# Patient Record
Sex: FEMALE | Race: WHITE | NOT HISPANIC OR LATINO | Employment: OTHER
[De-identification: names, ages, dates, MRNs, and addresses within clinical notes are randomized per-mention and may not be internally consistent; named-entity substitution may affect disease eponyms.]

---

## 2017-01-25 ENCOUNTER — CHARTING TRANS (OUTPATIENT)
Dept: OTHER | Age: 57
End: 2017-01-25

## 2017-01-25 ENCOUNTER — LAB SERVICES (OUTPATIENT)
Dept: OTHER | Age: 57
End: 2017-01-25

## 2017-01-28 LAB
25(OH)D3+25(OH)D2 SERPL-MCNC: 34.4 NG/ML (ref 30–100)
ALBUMIN SERPL-MCNC: 4.2 G/DL (ref 3.6–5.1)
ALBUMIN/GLOB SERPL: 1.4 (ref 1–2.4)
ALP SERPL-CCNC: 65 UNITS/L (ref 45–117)
ALT SERPL-CCNC: 42 UNITS/L
ANION GAP SERPL CALC-SCNC: 13 MMOL/L (ref 10–20)
AST SERPL-CCNC: 22 UNITS/L
BASOPHILS # BLD: 0.1 K/MCL (ref 0–0.3)
BASOPHILS NFR BLD: 1 %
BILIRUB SERPL-MCNC: 0.5 MG/DL (ref 0.2–1)
BUN SERPL-MCNC: 14 MG/DL (ref 10–20)
BUN/CREAT SERPL: 21 (ref 7–25)
CALCIUM SERPL-MCNC: 9.9 MG/DL (ref 8.4–10.2)
CHLORIDE SERPL-SCNC: 102 MMOL/L (ref 98–107)
CHOLEST SERPL-MCNC: 277 MG/DL
CHOLEST/HDLC SERPL: 3.2
CO2 SERPL-SCNC: 28 MMOL/L (ref 21–32)
CREAT SERPL-MCNC: 0.65 MG/DL (ref 0.51–0.95)
DIFFERENTIAL METHOD BLD: ABNORMAL
EOSINOPHIL # BLD: 0.3 K/MCL (ref 0.1–0.5)
EOSINOPHIL NFR BLD: 5 %
ERYTHROCYTE [DISTWIDTH] IN BLOOD: 13.1 % (ref 11–15)
GLOBULIN SER-MCNC: 3.1 G/DL (ref 2–4)
GLUCOSE SERPL-MCNC: 82 MG/DL (ref 65–99)
HCV AB SER QL: POSITIVE
HCV RNA SERPL NAA+PROBE-ACNC: NOT DETECTED
HCV RNA SERPL QL NAA+PROBE: NOT DETECTED
HDLC SERPL-MCNC: 87 MG/DL
HEMATOCRIT: 43.3 % (ref 36–46.5)
HEMOGLOBIN: 13.7 G/DL (ref 12–15.5)
LDLC SERPL CALC-MCNC: 177 MG/DL
LENGTH OF FAST TIME PATIENT: ABNORMAL HRS
LENGTH OF FAST TIME PATIENT: NORMAL HRS
LYMPHOCYTES # BLD: 1.9 K/MCL (ref 1–4)
LYMPHOCYTES NFR BLD: 41 %
MEAN CORPUSCULAR HEMOGLOBIN: 29.6 PG (ref 26–34)
MEAN CORPUSCULAR HGB CONC: 31.6 G/DL (ref 32–36.5)
MEAN CORPUSCULAR VOLUME: 93.5 FL (ref 78–100)
MONOCYTES # BLD: 0.5 K/MCL (ref 0.3–0.9)
MONOCYTES NFR BLD: 10 %
NEUTROPHILS # BLD: 2 K/MCL (ref 1.8–7.7)
NEUTROPHILS NFR BLD: 43 %
NONHDLC SERPL-MCNC: 190 MG/DL
PLATELET COUNT: ABNORMAL K/MCL (ref 140–450)
POTASSIUM SERPL-SCNC: 4 MMOL/L (ref 3.4–5.1)
RED CELL COUNT: 4.63 MIL/MCL (ref 4–5.2)
SODIUM SERPL-SCNC: 139 MMOL/L (ref 135–145)
TOTAL PROTEIN: 7.3 G/DL (ref 6.4–8.2)
TRIGL SERPL-MCNC: 64 MG/DL
TSH SERPL-ACNC: 1.35 MCUNITS/ML (ref 0.35–5)
WHITE BLOOD COUNT: 4.6 K/MCL (ref 4.2–11)

## 2017-01-30 ENCOUNTER — CHARTING TRANS (OUTPATIENT)
Dept: OTHER | Age: 57
End: 2017-01-30

## 2017-01-30 LAB — BACTERIA SPEC AEROBE CULT: NORMAL

## 2017-01-31 ENCOUNTER — CHARTING TRANS (OUTPATIENT)
Dept: OTHER | Age: 57
End: 2017-01-31

## 2017-02-01 LAB
CANDIDA RRNA VAG QL PROBE: NEGATIVE
G VAGINALIS RRNA GENITAL QL PROBE: NEGATIVE
PAPHPV WITH GENOTYPE PATIENTS OVER 30 YEARS OLD: NORMAL
T VAGINALIS RRNA GENITAL QL PROBE: NEGATIVE

## 2017-02-09 ENCOUNTER — IMAGING SERVICES (OUTPATIENT)
Dept: OTHER | Age: 57
End: 2017-02-09

## 2017-02-09 ENCOUNTER — HOSPITAL (OUTPATIENT)
Dept: OTHER | Age: 57
End: 2017-02-09
Attending: PHYSICIAN ASSISTANT

## 2017-02-10 ENCOUNTER — CHARTING TRANS (OUTPATIENT)
Dept: OTHER | Age: 57
End: 2017-02-10

## 2018-03-28 ENCOUNTER — LAB SERVICES (OUTPATIENT)
Dept: OTHER | Age: 58
End: 2018-03-28

## 2018-03-28 ENCOUNTER — CHARTING TRANS (OUTPATIENT)
Dept: OTHER | Age: 58
End: 2018-03-28

## 2018-04-02 ENCOUNTER — CHARTING TRANS (OUTPATIENT)
Dept: OTHER | Age: 58
End: 2018-04-02

## 2018-04-02 LAB — PAP WITH HIGH RISK HPV: NORMAL

## 2018-08-23 ENCOUNTER — CHARTING TRANS (OUTPATIENT)
Dept: OTHER | Age: 58
End: 2018-08-23

## 2018-11-01 VITALS
HEART RATE: 68 BPM | DIASTOLIC BLOOD PRESSURE: 64 MMHG | BODY MASS INDEX: 28.17 KG/M2 | WEIGHT: 165 LBS | HEIGHT: 64 IN | SYSTOLIC BLOOD PRESSURE: 110 MMHG | RESPIRATION RATE: 16 BRPM

## 2018-11-05 VITALS
DIASTOLIC BLOOD PRESSURE: 64 MMHG | HEIGHT: 64 IN | TEMPERATURE: 97.6 F | OXYGEN SATURATION: 98 % | WEIGHT: 165 LBS | BODY MASS INDEX: 28.17 KG/M2 | SYSTOLIC BLOOD PRESSURE: 100 MMHG | HEART RATE: 71 BPM

## 2018-11-05 VITALS
BODY MASS INDEX: 28.17 KG/M2 | WEIGHT: 165 LBS | TEMPERATURE: 97.6 F | OXYGEN SATURATION: 98 % | SYSTOLIC BLOOD PRESSURE: 86 MMHG | HEART RATE: 71 BPM | HEIGHT: 64 IN | DIASTOLIC BLOOD PRESSURE: 58 MMHG

## 2018-11-27 VITALS
DIASTOLIC BLOOD PRESSURE: 68 MMHG | SYSTOLIC BLOOD PRESSURE: 108 MMHG | RESPIRATION RATE: 16 BRPM | HEIGHT: 64 IN | BODY MASS INDEX: 28.32 KG/M2 | HEART RATE: 72 BPM

## 2018-12-07 ENCOUNTER — APPOINTMENT (OUTPATIENT)
Dept: FAMILY MEDICINE | Age: 58
End: 2018-12-07

## 2018-12-07 ENCOUNTER — TELEPHONE (OUTPATIENT)
Dept: FAMILY MEDICINE | Age: 58
End: 2018-12-07

## 2018-12-10 ENCOUNTER — OFFICE VISIT (OUTPATIENT)
Dept: FAMILY MEDICINE | Age: 58
End: 2018-12-10

## 2018-12-10 VITALS
BODY MASS INDEX: 29.57 KG/M2 | SYSTOLIC BLOOD PRESSURE: 120 MMHG | HEIGHT: 64 IN | DIASTOLIC BLOOD PRESSURE: 72 MMHG | WEIGHT: 173.2 LBS

## 2018-12-10 DIAGNOSIS — F41.8 MIXED ANXIETY AND DEPRESSIVE DISORDER: Primary | ICD-10-CM

## 2018-12-10 PROBLEM — Z12.31 ENCOUNTER FOR SCREENING MAMMOGRAM FOR BREAST CANCER: Status: ACTIVE | Noted: 2018-03-28

## 2018-12-10 PROBLEM — Z78.0 MENOPAUSE: Status: ACTIVE | Noted: 2017-01-25

## 2018-12-10 PROBLEM — Z01.411 ENCOUNTER FOR GYNECOLOGICAL EXAMINATION WITH ABNORMAL FINDING: Status: ACTIVE | Noted: 2018-03-28

## 2018-12-10 PROBLEM — Z86.010 HISTORY OF COLON POLYPS: Status: ACTIVE | Noted: 2017-02-07

## 2018-12-10 PROBLEM — K31.84 GASTROPARESIS: Status: ACTIVE | Noted: 2017-02-07

## 2018-12-10 PROBLEM — K63.5 COLON POLYPS: Status: ACTIVE | Noted: 2018-12-10

## 2018-12-10 PROBLEM — E78.00 HYPERCHOLESTEREMIA: Status: ACTIVE | Noted: 2017-01-31

## 2018-12-10 PROCEDURE — 99213 OFFICE O/P EST LOW 20 MIN: CPT | Performed by: PHYSICIAN ASSISTANT

## 2018-12-10 RX ORDER — BUPROPION HYDROCHLORIDE 300 MG/1
300 TABLET ORAL DAILY
Qty: 30 TABLET | Refills: 2 | Status: SHIPPED | OUTPATIENT
Start: 2018-12-10 | End: 2019-05-01 | Stop reason: SDUPTHER

## 2018-12-10 RX ORDER — BUPROPION HYDROCHLORIDE 150 MG/1
TABLET ORAL
COMMUNITY
Start: 2018-08-23 | End: 2018-12-10 | Stop reason: SDUPTHER

## 2018-12-10 RX ORDER — BUPROPION HYDROCHLORIDE 150 MG/1
300 TABLET ORAL DAILY
Qty: 30 TABLET | Refills: 2 | Status: CANCELLED | OUTPATIENT
Start: 2018-12-10

## 2018-12-17 ASSESSMENT — ENCOUNTER SYMPTOMS
SLEEP DISTURBANCE: 0
NERVOUS/ANXIOUS: 1
FATIGUE: 0
VOMITING: 0
ANOREXIA: 0
NAUSEA: 0
CHEST TIGHTNESS: 0
APPETITE CHANGE: 0
WHEEZING: 0
SHORTNESS OF BREATH: 0
DIZZINESS: 0
ABDOMINAL PAIN: 0
HEADACHES: 0

## 2019-01-13 ENCOUNTER — HOSPITAL (OUTPATIENT)
Dept: OTHER | Age: 59
End: 2019-01-13
Attending: PHYSICIAN ASSISTANT

## 2019-05-01 ENCOUNTER — TELEPHONE (OUTPATIENT)
Dept: FAMILY MEDICINE | Age: 59
End: 2019-05-01

## 2019-05-01 RX ORDER — BUPROPION HYDROCHLORIDE 300 MG/1
300 TABLET ORAL DAILY
Qty: 30 TABLET | Refills: 0 | Status: SHIPPED | OUTPATIENT
Start: 2019-05-01 | End: 2019-05-22 | Stop reason: SDUPTHER

## 2019-05-09 ENCOUNTER — APPOINTMENT (OUTPATIENT)
Dept: FAMILY MEDICINE | Age: 59
End: 2019-05-09

## 2019-05-22 ENCOUNTER — APPOINTMENT (OUTPATIENT)
Dept: FAMILY MEDICINE | Age: 59
End: 2019-05-22

## 2019-05-22 ENCOUNTER — OFFICE VISIT (OUTPATIENT)
Dept: FAMILY MEDICINE | Age: 59
End: 2019-05-22

## 2019-05-22 VITALS
DIASTOLIC BLOOD PRESSURE: 68 MMHG | HEART RATE: 72 BPM | WEIGHT: 145 LBS | SYSTOLIC BLOOD PRESSURE: 110 MMHG | HEIGHT: 64 IN | BODY MASS INDEX: 24.75 KG/M2

## 2019-05-22 DIAGNOSIS — F41.8 MIXED ANXIETY AND DEPRESSIVE DISORDER: Primary | ICD-10-CM

## 2019-05-22 PROCEDURE — 99213 OFFICE O/P EST LOW 20 MIN: CPT | Performed by: PHYSICIAN ASSISTANT

## 2019-05-22 RX ORDER — BUPROPION HYDROCHLORIDE 300 MG/1
300 TABLET ORAL DAILY
Qty: 90 TABLET | Refills: 1 | Status: SHIPPED | OUTPATIENT
Start: 2019-05-22 | End: 2019-06-06

## 2019-05-22 ASSESSMENT — PATIENT HEALTH QUESTIONNAIRE - PHQ9
2. FEELING DOWN, DEPRESSED OR HOPELESS: NOT AT ALL
SUM OF ALL RESPONSES TO PHQ9 QUESTIONS 1 AND 2: 0
1. LITTLE INTEREST OR PLEASURE IN DOING THINGS: NOT AT ALL
SUM OF ALL RESPONSES TO PHQ9 QUESTIONS 1 AND 2: 0

## 2019-06-04 ENCOUNTER — TELEPHONE (OUTPATIENT)
Dept: FAMILY MEDICINE | Age: 59
End: 2019-06-04

## 2019-06-06 RX ORDER — BUPROPION HYDROCHLORIDE 150 MG/1
150 TABLET, EXTENDED RELEASE ORAL 2 TIMES DAILY
Qty: 180 TABLET | Refills: 1 | Status: SHIPPED | OUTPATIENT
Start: 2019-06-06 | End: 2020-01-21 | Stop reason: SDUPTHER

## 2019-07-01 ENCOUNTER — OFFICE VISIT (OUTPATIENT)
Dept: FAMILY MEDICINE | Age: 59
End: 2019-07-01

## 2019-07-01 VITALS
HEART RATE: 58 BPM | SYSTOLIC BLOOD PRESSURE: 104 MMHG | RESPIRATION RATE: 16 BRPM | BODY MASS INDEX: 23.9 KG/M2 | TEMPERATURE: 97.2 F | HEIGHT: 64 IN | OXYGEN SATURATION: 100 % | DIASTOLIC BLOOD PRESSURE: 68 MMHG | WEIGHT: 140 LBS

## 2019-07-01 DIAGNOSIS — R22.0 MASS OF POSTAURICULAR AREA: Primary | ICD-10-CM

## 2019-07-01 PROCEDURE — 99202 OFFICE O/P NEW SF 15 MIN: CPT | Performed by: FAMILY MEDICINE

## 2019-07-01 SDOH — HEALTH STABILITY: MENTAL HEALTH
STRESS IS WHEN SOMEONE FEELS TENSE, NERVOUS, ANXIOUS, OR CAN'T SLEEP AT NIGHT BECAUSE THEIR MIND IS TROUBLED. HOW STRESSED ARE YOU?: NOT AT ALL

## 2019-07-01 SDOH — HEALTH STABILITY: PHYSICAL HEALTH: ON AVERAGE, HOW MANY MINUTES DO YOU ENGAGE IN EXERCISE AT THIS LEVEL?: 0 MIN

## 2019-07-01 SDOH — HEALTH STABILITY: PHYSICAL HEALTH: ON AVERAGE, HOW MANY DAYS PER WEEK DO YOU ENGAGE IN MODERATE TO STRENUOUS EXERCISE (LIKE A BRISK WALK)?: 0 DAYS

## 2019-07-01 ASSESSMENT — ENCOUNTER SYMPTOMS
SINUS PAIN: 0
SORE THROAT: 0
HEADACHES: 0
FEVER: 0
DIZZINESS: 0
RHINORRHEA: 0

## 2019-07-01 ASSESSMENT — PATIENT HEALTH QUESTIONNAIRE - PHQ9
SUM OF ALL RESPONSES TO PHQ9 QUESTIONS 1 AND 2: 0
1. LITTLE INTEREST OR PLEASURE IN DOING THINGS: NOT AT ALL
SUM OF ALL RESPONSES TO PHQ9 QUESTIONS 1 AND 2: 0
2. FEELING DOWN, DEPRESSED OR HOPELESS: NOT AT ALL

## 2019-08-01 ENCOUNTER — OFFICE VISIT (OUTPATIENT)
Dept: FAMILY MEDICINE | Age: 59
End: 2019-08-01

## 2019-08-01 VITALS
BODY MASS INDEX: 22.98 KG/M2 | RESPIRATION RATE: 16 BRPM | HEART RATE: 66 BPM | WEIGHT: 134.6 LBS | HEIGHT: 64 IN | SYSTOLIC BLOOD PRESSURE: 100 MMHG | DIASTOLIC BLOOD PRESSURE: 62 MMHG

## 2019-08-01 DIAGNOSIS — Z00.00 PREVENTATIVE HEALTH CARE: Primary | ICD-10-CM

## 2019-08-01 DIAGNOSIS — E78.2 MIXED HYPERLIPIDEMIA: ICD-10-CM

## 2019-08-01 DIAGNOSIS — Z12.39 SCREENING FOR BREAST CANCER: ICD-10-CM

## 2019-08-01 DIAGNOSIS — E87.5 HYPERKALEMIA: ICD-10-CM

## 2019-08-01 PROCEDURE — 80050 GENERAL HEALTH PANEL: CPT | Performed by: PHYSICIAN ASSISTANT

## 2019-08-01 PROCEDURE — 81001 URINALYSIS AUTO W/SCOPE: CPT | Performed by: PHYSICIAN ASSISTANT

## 2019-08-01 PROCEDURE — 80061 LIPID PANEL: CPT | Performed by: PHYSICIAN ASSISTANT

## 2019-08-01 PROCEDURE — 99396 PREV VISIT EST AGE 40-64: CPT | Performed by: PHYSICIAN ASSISTANT

## 2019-08-01 ASSESSMENT — PATIENT HEALTH QUESTIONNAIRE - PHQ9
1. LITTLE INTEREST OR PLEASURE IN DOING THINGS: NOT AT ALL
SUM OF ALL RESPONSES TO PHQ9 QUESTIONS 1 AND 2: 0
SUM OF ALL RESPONSES TO PHQ9 QUESTIONS 1 AND 2: 0
2. FEELING DOWN, DEPRESSED OR HOPELESS: NOT AT ALL

## 2019-08-01 ASSESSMENT — ENCOUNTER SYMPTOMS
ALLERGIC/IMMUNOLOGIC NEGATIVE: 1
CONSTITUTIONAL NEGATIVE: 1
EYES NEGATIVE: 1
PSYCHIATRIC NEGATIVE: 1
NEUROLOGICAL NEGATIVE: 1
ENDOCRINE NEGATIVE: 1
RESPIRATORY NEGATIVE: 1
GASTROINTESTINAL NEGATIVE: 1
HEMATOLOGIC/LYMPHATIC NEGATIVE: 1

## 2019-08-02 LAB
ALBUMIN SERPL-MCNC: 3.8 G/DL (ref 3.6–5.1)
ALBUMIN/GLOB SERPL: 1.4 {RATIO} (ref 1–2.4)
ALP SERPL-CCNC: 66 UNITS/L (ref 45–117)
ALT SERPL-CCNC: 22 UNITS/L
ANION GAP SERPL CALC-SCNC: 10 MMOL/L (ref 10–20)
APPEARANCE UR: ABNORMAL
AST SERPL-CCNC: 17 UNITS/L
BACTERIA #/AREA URNS HPF: ABNORMAL /HPF
BASOPHILS # BLD AUTO: 0.1 K/MCL (ref 0–0.3)
BASOPHILS NFR BLD AUTO: 1 %
BILIRUB SERPL-MCNC: 0.4 MG/DL (ref 0.2–1)
BILIRUB UR QL STRIP: NEGATIVE
BUN SERPL-MCNC: 14 MG/DL (ref 6–20)
BUN/CREAT SERPL: 25 (ref 7–25)
CALCIUM SERPL-MCNC: 9.2 MG/DL (ref 8.4–10.2)
CHLORIDE SERPL-SCNC: 106 MMOL/L (ref 98–107)
CHOLEST SERPL-MCNC: 297 MG/DL
CHOLEST/HDLC SERPL: 3.3 {RATIO}
CO2 SERPL-SCNC: 28 MMOL/L (ref 21–32)
COLOR UR: YELLOW
CREAT SERPL-MCNC: 0.57 MG/DL (ref 0.51–0.95)
DIFFERENTIAL METHOD BLD: ABNORMAL
EOSINOPHIL # BLD AUTO: 0.2 K/MCL (ref 0.1–0.5)
EOSINOPHIL NFR SPEC: 4 %
ERYTHROCYTE [DISTWIDTH] IN BLOOD: 12.8 % (ref 11–15)
FASTING STATUS PATIENT QL REPORTED: ABNORMAL HRS
GLOBULIN SER-MCNC: 2.7 G/DL (ref 2–4)
GLUCOSE SERPL-MCNC: 89 MG/DL (ref 65–99)
GLUCOSE UR STRIP-MCNC: NEGATIVE MG/DL
HCT VFR BLD CALC: 39.9 % (ref 36–46.5)
HDLC SERPL-MCNC: 89 MG/DL
HGB BLD-MCNC: 12.4 G/DL (ref 12–15.5)
HGB UR QL STRIP: NEGATIVE
HYALINE CASTS #/AREA URNS LPF: ABNORMAL /LPF (ref 0–5)
IMM GRANULOCYTES # BLD AUTO: 0 K/MCL (ref 0–0.2)
IMM GRANULOCYTES NFR BLD: 0 %
KETONES UR STRIP-MCNC: NEGATIVE MG/DL
LDLC SERPL-MCNC: 195 MG/DL
LENGTH OF FAST TIME PATIENT: ABNORMAL HRS
LEUKOCYTE ESTERASE UR QL STRIP: ABNORMAL
LYMPHOCYTES # BLD MANUAL: 2.1 K/MCL (ref 1–4)
LYMPHOCYTES NFR BLD MANUAL: 44 %
MCH RBC QN AUTO: 30 PG (ref 26–34)
MCHC RBC AUTO-ENTMCNC: 31.1 G/DL (ref 32–36.5)
MCV RBC AUTO: 96.4 FL (ref 78–100)
MONOCYTES # BLD MANUAL: 0.4 K/MCL (ref 0.3–0.9)
MONOCYTES NFR BLD MANUAL: 8 %
MUCOUS THREADS URNS QL MICRO: PRESENT
NEUTROPHILS # BLD: 2.1 K/MCL (ref 1.8–7.7)
NEUTROPHILS NFR BLD AUTO: 43 %
NITRITE UR QL STRIP: NEGATIVE
NONHDLC SERPL-MCNC: 208 MG/DL
NRBC BLD MANUAL-RTO: 0 /100 WBC
PH UR STRIP: 6 UNITS (ref 5–7)
PLATELET # BLD: ABNORMAL K/MCL (ref 140–450)
POTASSIUM SERPL-SCNC: 5.4 MMOL/L (ref 3.4–5.1)
PROT SERPL-MCNC: 6.5 G/DL (ref 6.4–8.2)
PROT UR STRIP-MCNC: NEGATIVE MG/DL
RBC # BLD: 4.14 MIL/MCL (ref 4–5.2)
RBC #/AREA URNS HPF: ABNORMAL /HPF (ref 0–2)
SODIUM SERPL-SCNC: 139 MMOL/L (ref 135–145)
SP GR UR STRIP: 1.02 (ref 1–1.03)
SPECIMEN SOURCE: ABNORMAL
SQUAMOUS #/AREA URNS HPF: ABNORMAL /HPF (ref 0–5)
TRIGL SERPL-MCNC: 63 MG/DL
TSH SERPL-ACNC: 2.1 MCUNITS/ML (ref 0.35–5)
UROBILINOGEN UR STRIP-MCNC: 0.2 MG/DL (ref 0–1)
WBC # BLD: 4.8 K/MCL (ref 4.2–11)
WBC #/AREA URNS HPF: ABNORMAL /HPF (ref 0–5)

## 2019-08-02 RX ORDER — ROSUVASTATIN CALCIUM 20 MG/1
20 TABLET, COATED ORAL AT BEDTIME
Qty: 90 TABLET | Refills: 0 | Status: SHIPPED | OUTPATIENT
Start: 2019-08-02

## 2019-08-05 ENCOUNTER — LAB SERVICES (OUTPATIENT)
Dept: FAMILY MEDICINE | Age: 59
End: 2019-08-05

## 2019-08-05 DIAGNOSIS — E87.5 HYPERKALEMIA: ICD-10-CM

## 2019-08-05 PROCEDURE — 84132 ASSAY OF SERUM POTASSIUM: CPT | Performed by: PHYSICIAN ASSISTANT

## 2019-08-06 LAB — POTASSIUM SERPL-SCNC: 4.4 MMOL/L (ref 3.4–5.1)

## 2019-08-07 ENCOUNTER — TELEPHONE (OUTPATIENT)
Dept: FAMILY MEDICINE | Age: 59
End: 2019-08-07

## 2020-01-21 ENCOUNTER — TELEPHONE (OUTPATIENT)
Dept: FAMILY MEDICINE | Age: 60
End: 2020-01-21

## 2020-01-21 RX ORDER — BUPROPION HYDROCHLORIDE 150 MG/1
150 TABLET, EXTENDED RELEASE ORAL 2 TIMES DAILY
Qty: 180 TABLET | Refills: 0 | Status: SHIPPED | OUTPATIENT
Start: 2020-01-21 | End: 2020-05-21 | Stop reason: SDUPTHER

## 2020-05-20 ENCOUNTER — TELEPHONE (OUTPATIENT)
Dept: FAMILY MEDICINE | Age: 60
End: 2020-05-20

## 2020-05-21 ENCOUNTER — V-VISIT (OUTPATIENT)
Dept: FAMILY MEDICINE | Age: 60
End: 2020-05-21

## 2020-05-21 ENCOUNTER — E-ADVICE (OUTPATIENT)
Dept: FAMILY MEDICINE | Age: 60
End: 2020-05-21

## 2020-05-21 VITALS — HEIGHT: 64 IN | BODY MASS INDEX: 21.85 KG/M2 | WEIGHT: 128 LBS

## 2020-05-21 DIAGNOSIS — F41.8 MIXED ANXIETY AND DEPRESSIVE DISORDER: Primary | ICD-10-CM

## 2020-05-21 PROCEDURE — 99212 OFFICE O/P EST SF 10 MIN: CPT | Performed by: PHYSICIAN ASSISTANT

## 2020-05-21 RX ORDER — BUPROPION HYDROCHLORIDE 150 MG/1
TABLET, EXTENDED RELEASE ORAL
Qty: 22 TABLET | Refills: 0 | Status: SHIPPED | OUTPATIENT
Start: 2020-05-21

## 2020-05-21 ASSESSMENT — PATIENT HEALTH QUESTIONNAIRE - PHQ9
2. FEELING DOWN, DEPRESSED OR HOPELESS: NOT AT ALL
SUM OF ALL RESPONSES TO PHQ9 QUESTIONS 1 AND 2: 0
CLINICAL INTERPRETATION OF PHQ9 SCORE: NO FURTHER SCREENING NEEDED
CLINICAL INTERPRETATION OF PHQ2 SCORE: NO FURTHER SCREENING NEEDED
SUM OF ALL RESPONSES TO PHQ9 QUESTIONS 1 AND 2: 0
1. LITTLE INTEREST OR PLEASURE IN DOING THINGS: NOT AT ALL

## 2020-05-27 ASSESSMENT — ENCOUNTER SYMPTOMS
DEPRESSION: 1
NERVOUS/ANXIOUS: 0
INSOMNIA: 0
CONSTIPATION: 0
NAUSEA: 0
VOMITING: 0

## 2021-04-22 ENCOUNTER — HOSPITAL ENCOUNTER (OUTPATIENT)
Dept: GENERAL RADIOLOGY | Age: 61
Discharge: HOME | End: 2021-04-22
Attending: FAMILY MEDICINE

## 2021-04-22 ENCOUNTER — WALK IN (OUTPATIENT)
Dept: URGENT CARE | Age: 61
End: 2021-04-22
Attending: FAMILY MEDICINE

## 2021-04-22 VITALS
SYSTOLIC BLOOD PRESSURE: 114 MMHG | OXYGEN SATURATION: 100 % | BODY MASS INDEX: 25.27 KG/M2 | WEIGHT: 148 LBS | HEIGHT: 64 IN | TEMPERATURE: 97.8 F | RESPIRATION RATE: 18 BRPM | HEART RATE: 57 BPM | DIASTOLIC BLOOD PRESSURE: 64 MMHG

## 2021-04-22 DIAGNOSIS — S67.191A CRUSHING INJURY OF LEFT INDEX FINGER, INITIAL ENCOUNTER: ICD-10-CM

## 2021-04-22 DIAGNOSIS — R11.0 NAUSEA: ICD-10-CM

## 2021-04-22 DIAGNOSIS — S62.660A CLOSED NONDISPLACED FRACTURE OF DISTAL PHALANX OF RIGHT INDEX FINGER, INITIAL ENCOUNTER: Primary | ICD-10-CM

## 2021-04-22 PROCEDURE — 73140 X-RAY EXAM OF FINGER(S): CPT

## 2021-04-22 PROCEDURE — 10002803 HB RX 637: Performed by: FAMILY MEDICINE

## 2021-04-22 PROCEDURE — 29130 APPL FINGER SPLINT STATIC: CPT

## 2021-04-22 PROCEDURE — 99213 OFFICE O/P EST LOW 20 MIN: CPT

## 2021-04-22 RX ORDER — ONDANSETRON 4 MG/1
4 TABLET, FILM COATED ORAL EVERY 8 HOURS PRN
Qty: 5 TABLET | Refills: 0 | Status: SHIPPED | OUTPATIENT
Start: 2021-04-22

## 2021-04-22 RX ORDER — ONDANSETRON 4 MG/1
4 TABLET, ORALLY DISINTEGRATING ORAL ONCE
Status: COMPLETED | OUTPATIENT
Start: 2021-04-22 | End: 2021-04-22

## 2021-04-22 RX ORDER — HYDROCODONE BITARTRATE AND ACETAMINOPHEN 5; 325 MG/1; MG/1
1 TABLET ORAL EVERY 6 HOURS PRN
Qty: 10 TABLET | Refills: 0 | Status: SHIPPED | OUTPATIENT
Start: 2021-04-22

## 2021-04-22 RX ADMIN — ONDANSETRON 4 MG: 4 TABLET, ORALLY DISINTEGRATING ORAL at 12:24

## 2021-04-22 ASSESSMENT — ENCOUNTER SYMPTOMS
FEVER: 0
SINUS PAIN: 0
VOMITING: 0
ABDOMINAL PAIN: 0
NAUSEA: 0
DIARRHEA: 0
CHEST TIGHTNESS: 0
COUGH: 0
HEADACHES: 0
DIZZINESS: 0
APPETITE CHANGE: 0
WHEEZING: 0
FATIGUE: 0
EYE PAIN: 0
SHORTNESS OF BREATH: 0
ADENOPATHY: 0
SLEEP DISTURBANCE: 0
SORE THROAT: 0

## 2021-11-08 ENCOUNTER — EXTERNAL RECORD (OUTPATIENT)
Dept: HEALTH INFORMATION MANAGEMENT | Facility: OTHER | Age: 61
End: 2021-11-08

## 2021-11-16 ENCOUNTER — TELEPHONE (OUTPATIENT)
Dept: OBGYN | Age: 61
End: 2021-11-16

## 2021-11-17 ENCOUNTER — TELEPHONE (OUTPATIENT)
Dept: OBGYN | Age: 61
End: 2021-11-17

## 2021-12-02 ENCOUNTER — LAB SERVICES (OUTPATIENT)
Dept: OBGYN | Age: 61
End: 2021-12-02

## 2021-12-02 DIAGNOSIS — N95.1 SYMPTOMATIC MENOPAUSAL OR FEMALE CLIMACTERIC STATES: Primary | ICD-10-CM

## 2022-01-01 ENCOUNTER — EXTERNAL RECORD (OUTPATIENT)
Dept: OTHER | Age: 62
End: 2022-01-01

## 2022-01-11 ENCOUNTER — APPOINTMENT (OUTPATIENT)
Dept: OBGYN | Age: 62
End: 2022-01-11

## 2022-02-08 ENCOUNTER — APPOINTMENT (OUTPATIENT)
Dept: OBGYN | Age: 62
End: 2022-02-08

## 2022-03-24 ENCOUNTER — OFF PREMISE (OUTPATIENT)
Dept: OBGYN | Age: 62
End: 2022-03-24

## 2022-03-24 ENCOUNTER — TELEPHONE (OUTPATIENT)
Dept: OBGYN | Age: 62
End: 2022-03-24

## 2022-05-13 ENCOUNTER — APPOINTMENT (OUTPATIENT)
Dept: OBGYN | Age: 62
End: 2022-05-13

## 2022-05-16 ENCOUNTER — LAB SERVICES (OUTPATIENT)
Dept: LAB | Age: 62
End: 2022-05-16
Attending: NURSE PRACTITIONER

## 2022-05-16 DIAGNOSIS — N95.1 SYMPTOMATIC MENOPAUSAL OR FEMALE CLIMACTERIC STATES: ICD-10-CM

## 2022-05-16 LAB
ESTRADIOL SERPL-MCNC: 146 PG/ML
PROGEST SERPL-MCNC: 5.9 NG/ML
RAINBOW EXTRA TUBES HOLD SPECIMEN: NORMAL

## 2022-05-16 PROCEDURE — 84144 ASSAY OF PROGESTERONE: CPT

## 2022-05-16 PROCEDURE — 10000083 MISCELLANEOUS TEST

## 2022-05-16 PROCEDURE — 82681 ASSAY DIR MEAS FR ESTRADIOL: CPT

## 2022-05-16 PROCEDURE — 82627 DEHYDROEPIANDROSTERONE: CPT

## 2022-05-16 PROCEDURE — 84403 ASSAY OF TOTAL TESTOSTERONE: CPT

## 2022-05-16 PROCEDURE — 82677 ASSAY OF ESTRIOL: CPT

## 2022-05-16 PROCEDURE — 36415 COLL VENOUS BLD VENIPUNCTURE: CPT

## 2022-05-16 PROCEDURE — 82670 ASSAY OF TOTAL ESTRADIOL: CPT

## 2022-05-17 LAB
DHEA-S SERPL-MCNC: 37.6 MCG/DL (ref 8–391)
ESTRIOL SERPL-MCNC: 0.23 NG/ML

## 2022-05-19 ENCOUNTER — OFF PREMISE (OUTPATIENT)
Dept: OBGYN | Age: 62
End: 2022-05-19

## 2022-05-23 LAB
REF LAB TEST NAME: NORMAL
SERVICE CMNT-IMP: NORMAL

## 2022-05-27 LAB
SHBG SERPL-SCNC: 84 NMOL/L (ref 17–125)
TESTOST FREE SERPL DL<=1.0 NG/DL-MCNC: 1.8 PG/ML (ref 0.6–3.8)
TESTOST SERPL-MCNC: 20 NG/DL (ref 5–32)

## 2022-06-21 ENCOUNTER — TELEPHONIC VISIT (OUTPATIENT)
Dept: OBGYN | Age: 62
End: 2022-06-21

## 2022-06-21 ENCOUNTER — OFF PREMISE (OUTPATIENT)
Dept: OBGYN | Age: 62
End: 2022-06-21

## 2022-06-21 DIAGNOSIS — N95.1 SYMPTOMATIC MENOPAUSAL OR FEMALE CLIMACTERIC STATES: Primary | ICD-10-CM

## 2022-06-21 PROCEDURE — 99443 TELEPHONE E&M BY PHYSICIAN EST PT NOT ORIG PREV 7 DAYS 21-30 MIN: CPT | Performed by: NURSE PRACTITIONER

## 2022-06-21 ASSESSMENT — ENCOUNTER SYMPTOMS
LIGHT-HEADEDNESS: 0
DIAPHORESIS: 0
FATIGUE: 0
FEVER: 0
CONSTIPATION: 0
NAUSEA: 0
VOMITING: 0
COLOR CHANGE: 0
CHILLS: 0
DIARRHEA: 0
BLOOD IN STOOL: 0
ABDOMINAL PAIN: 0
APPETITE CHANGE: 0
HEADACHES: 0
SHORTNESS OF BREATH: 0
EYES NEGATIVE: 1
ABDOMINAL DISTENTION: 0
DIZZINESS: 0
PSYCHIATRIC NEGATIVE: 1
UNEXPECTED WEIGHT CHANGE: 0
ACTIVITY CHANGE: 0
ADENOPATHY: 0
COUGH: 0

## 2022-12-14 ENCOUNTER — OFF PREMISE (OUTPATIENT)
Dept: OBGYN | Age: 62
End: 2022-12-14

## 2023-03-27 ENCOUNTER — OFFICE VISIT (OUTPATIENT)
Dept: INTERNAL MEDICINE CLINIC | Facility: CLINIC | Age: 63
End: 2023-03-27
Payer: COMMERCIAL

## 2023-03-27 VITALS
OXYGEN SATURATION: 97 % | HEART RATE: 79 BPM | DIASTOLIC BLOOD PRESSURE: 70 MMHG | WEIGHT: 179 LBS | HEIGHT: 64 IN | SYSTOLIC BLOOD PRESSURE: 120 MMHG | BODY MASS INDEX: 30.56 KG/M2

## 2023-03-27 DIAGNOSIS — Z12.31 ENCOUNTER FOR SCREENING MAMMOGRAM FOR MALIGNANT NEOPLASM OF BREAST: ICD-10-CM

## 2023-03-27 DIAGNOSIS — E78.5 HYPERLIPIDEMIA, UNSPECIFIED HYPERLIPIDEMIA TYPE: ICD-10-CM

## 2023-03-27 DIAGNOSIS — Z78.0 MENOPAUSE: ICD-10-CM

## 2023-03-27 DIAGNOSIS — Z00.00 ANNUAL PHYSICAL EXAM: Primary | ICD-10-CM

## 2023-03-27 DIAGNOSIS — L72.0 MILIA: ICD-10-CM

## 2023-03-27 DIAGNOSIS — L65.9 HAIR LOSS: ICD-10-CM

## 2023-03-27 DIAGNOSIS — M54.32 SCIATICA OF LEFT SIDE: ICD-10-CM

## 2023-03-27 DIAGNOSIS — Z12.11 SCREENING FOR MALIGNANT NEOPLASM OF COLON: ICD-10-CM

## 2023-03-27 PROCEDURE — 99386 PREV VISIT NEW AGE 40-64: CPT | Performed by: STUDENT IN AN ORGANIZED HEALTH CARE EDUCATION/TRAINING PROGRAM

## 2023-03-27 SDOH — ECONOMIC STABILITY: FOOD INSECURITY: WITHIN THE PAST 12 MONTHS, THE FOOD YOU BOUGHT JUST DIDN'T LAST AND YOU DIDN'T HAVE MONEY TO GET MORE.: NEVER TRUE

## 2023-03-27 SDOH — ECONOMIC STABILITY: INCOME INSECURITY: HOW HARD IS IT FOR YOU TO PAY FOR THE VERY BASICS LIKE FOOD, HOUSING, MEDICAL CARE, AND HEATING?: NOT VERY HARD

## 2023-03-27 SDOH — ECONOMIC STABILITY: FOOD INSECURITY: WITHIN THE PAST 12 MONTHS, YOU WORRIED THAT YOUR FOOD WOULD RUN OUT BEFORE YOU GOT MONEY TO BUY MORE.: NEVER TRUE

## 2023-03-27 SDOH — ECONOMIC STABILITY: HOUSING INSECURITY
IN THE LAST 12 MONTHS, WAS THERE A TIME WHEN YOU DID NOT HAVE A STEADY PLACE TO SLEEP OR SLEPT IN A SHELTER (INCLUDING NOW)?: NO

## 2023-03-27 ASSESSMENT — PATIENT HEALTH QUESTIONNAIRE - PHQ9
SUM OF ALL RESPONSES TO PHQ QUESTIONS 1-9: 0
1. LITTLE INTEREST OR PLEASURE IN DOING THINGS: 0
SUM OF ALL RESPONSES TO PHQ QUESTIONS 1-9: 0
SUM OF ALL RESPONSES TO PHQ9 QUESTIONS 1 & 2: 0
2. FEELING DOWN, DEPRESSED OR HOPELESS: 0
SUM OF ALL RESPONSES TO PHQ QUESTIONS 1-9: 0
SUM OF ALL RESPONSES TO PHQ QUESTIONS 1-9: 0

## 2023-03-27 ASSESSMENT — ENCOUNTER SYMPTOMS
SHORTNESS OF BREATH: 0
VOMITING: 0
ABDOMINAL PAIN: 0
NAUSEA: 0

## 2023-03-27 NOTE — PROGRESS NOTES
SUBJECTIVE:   Manuel Salguero is a 61 y.o. female seen for a visit regarding   Chief Complaint   Patient presents with    Establish Care    Skin Problem     Moles on back        HPI:     Moved from [place] about 9 months ago. Has . Has question about skin lesion on the back which has appearance of small cyst.    Uses oral progestin and estrogen cream for menopausal symptoms, was seeing gynecology. Left sided sciatica: for years and has gradually been worsening. Low back pain radiates down left leg. Saw chiropractor however would be expensive. No numbness, weakness. Patient denies bowel and bladder dysfunction. Patient denies any fevers, chills, night sweats, unexplained weight loss or recent infection. Healthcare maintenance: Due for colonoscopy (was seeing Dr. Mukul Greene), last colonoscopy [id number], may have had polyps. had EGD a few times for acid reflux, currently not bothering her. Brother passed from esophageal CA. Does not believe she is behind on paps, last pap within 5 years. Mother had breast CA at older age. UTD on tetanus. Declines flu, COVID vaccines, shingles. Past Medical History, Past Surgical History, Family history, Social History, and Medications were all reviewed with the patient today and updated as necessary. Patient Active Problem List   Diagnosis    Sciatica of left side    Hair loss    Milia    Hyperlipidemia     Past Surgical History:   Procedure Laterality Date     SECTION       History reviewed. No pertinent family history.   Social History     Socioeconomic History    Marital status:      Spouse name: Not on file    Number of children: Not on file    Years of education: Not on file    Highest education level: Not on file   Occupational History    Not on file   Tobacco Use    Smoking status: Former     Packs/day: 0.50     Years: 10.00     Pack years: 5.00     Types: Cigarettes     Quit date:      Years since quittin.2    Smokeless tobacco:

## 2023-04-06 ENCOUNTER — TELEPHONE (OUTPATIENT)
Dept: INTERNAL MEDICINE CLINIC | Facility: CLINIC | Age: 63
End: 2023-04-06

## 2023-04-06 NOTE — TELEPHONE ENCOUNTER
----- Message from Felisha Leonard sent at 4/6/2023  3:14 PM EDT -----  Subject: Referral Request    Reason for referral request? Patient stated that when she was scheduling   her mammogram and they were asking questions and was determined that she   will need a Diagnostic Bilateral mammogram and Ultrasound for left breast   pain  Provider patient wants to be referred to(if known):     Provider Phone Number(if known): Additional Information for Provider?  Patient will be traveling starting   4/15 and will be gone for 6-weeks would like get this complete before   leaving.   ---------------------------------------------------------------------------  --------------  [address]    [phone]; OK to leave message on voicemail  ---------------------------------------------------------------------------  --------------

## 2023-04-06 NOTE — TELEPHONE ENCOUNTER
Pt states that she has been having some left breast pain. Per Dr Reshma Chirinos pt scheduled for a breast exam appointment tomorrow.

## 2023-04-07 ENCOUNTER — TELEPHONE (OUTPATIENT)
Dept: INTERNAL MEDICINE CLINIC | Facility: CLINIC | Age: 63
End: 2023-04-07

## 2023-04-07 ENCOUNTER — OFFICE VISIT (OUTPATIENT)
Dept: INTERNAL MEDICINE CLINIC | Facility: CLINIC | Age: 63
End: 2023-04-07
Payer: COMMERCIAL

## 2023-04-07 VITALS
BODY MASS INDEX: 30.73 KG/M2 | HEIGHT: 64 IN | HEART RATE: 74 BPM | OXYGEN SATURATION: 98 % | DIASTOLIC BLOOD PRESSURE: 62 MMHG | SYSTOLIC BLOOD PRESSURE: 128 MMHG

## 2023-04-07 DIAGNOSIS — Z00.00 ANNUAL PHYSICAL EXAM: ICD-10-CM

## 2023-04-07 DIAGNOSIS — E78.5 HYPERLIPIDEMIA, UNSPECIFIED HYPERLIPIDEMIA TYPE: ICD-10-CM

## 2023-04-07 DIAGNOSIS — N63.15 MASS OVERLAPPING MULTIPLE QUADRANTS OF RIGHT BREAST: ICD-10-CM

## 2023-04-07 DIAGNOSIS — N64.4 BREAST PAIN, LEFT: ICD-10-CM

## 2023-04-07 DIAGNOSIS — N63.15 MASS OVERLAPPING MULTIPLE QUADRANTS OF RIGHT BREAST: Primary | ICD-10-CM

## 2023-04-07 DIAGNOSIS — N64.4 BREAST PAIN, LEFT: Primary | ICD-10-CM

## 2023-04-07 LAB
APPEARANCE UR: ABNORMAL
BACTERIA URNS QL MICRO: ABNORMAL /HPF
BASOPHILS # BLD: 0.1 K/UL (ref 0–0.2)
BASOPHILS NFR BLD: 1 % (ref 0–2)
BILIRUB UR QL: NEGATIVE
CASTS URNS QL MICRO: 0 /LPF
CHOLEST SERPL-MCNC: 256 MG/DL
COLOR UR: ABNORMAL
CRYSTALS URNS QL MICRO: 0 /LPF
DIFFERENTIAL METHOD BLD: ABNORMAL
EOSINOPHIL # BLD: 0.3 K/UL (ref 0–0.8)
EOSINOPHIL NFR BLD: 6 % (ref 0.5–7.8)
EPI CELLS #/AREA URNS HPF: ABNORMAL /HPF
ERYTHROCYTE [DISTWIDTH] IN BLOOD BY AUTOMATED COUNT: 13 % (ref 11.9–14.6)
EST. AVERAGE GLUCOSE BLD GHB EST-MCNC: 108 MG/DL
GLUCOSE UR STRIP.AUTO-MCNC: NEGATIVE MG/DL
HBA1C MFR BLD: 5.4 % (ref 4.8–5.6)
HCT VFR BLD AUTO: 38.6 % (ref 35.8–46.3)
HDLC SERPL-MCNC: 96 MG/DL (ref 40–60)
HDLC SERPL: 2.7
HGB BLD-MCNC: 12.5 G/DL (ref 11.7–15.4)
HGB UR QL STRIP: NEGATIVE
IMM GRANULOCYTES # BLD AUTO: 0 K/UL (ref 0–0.5)
IMM GRANULOCYTES NFR BLD AUTO: 0 % (ref 0–5)
KETONES UR QL STRIP.AUTO: NEGATIVE MG/DL
LDLC SERPL CALC-MCNC: 146.4 MG/DL
LEUKOCYTE ESTERASE UR QL STRIP.AUTO: NEGATIVE
LYMPHOCYTES # BLD: 2.3 K/UL (ref 0.5–4.6)
LYMPHOCYTES NFR BLD: 48 % (ref 13–44)
MCH RBC QN AUTO: 30.2 PG (ref 26.1–32.9)
MCHC RBC AUTO-ENTMCNC: 32.4 G/DL (ref 31.4–35)
MCV RBC AUTO: 93.2 FL (ref 82–102)
MONOCYTES # BLD: 0.5 K/UL (ref 0.1–1.3)
MONOCYTES NFR BLD: 10 % (ref 4–12)
MUCOUS THREADS URNS QL MICRO: 0 /LPF
NEUTS SEG # BLD: 1.7 K/UL (ref 1.7–8.2)
NEUTS SEG NFR BLD: 35 % (ref 43–78)
NITRITE UR QL STRIP.AUTO: NEGATIVE
NRBC # BLD: 0 K/UL (ref 0–0.2)
OTHER OBSERVATIONS: ABNORMAL
PH UR STRIP: 5 (ref 5–9)
PLATELET # BLD AUTO: 59 K/UL (ref 150–450)
PLATELET COMMENT: ADEQUATE
PMV BLD AUTO: 10.6 FL (ref 9.4–12.3)
PROT UR STRIP-MCNC: NEGATIVE MG/DL
RBC # BLD AUTO: 4.14 M/UL (ref 4.05–5.2)
RBC #/AREA URNS HPF: ABNORMAL /HPF
RBC MORPH BLD: ABNORMAL
SP GR UR REFRACTOMETRY: 1.02 (ref 1–1.02)
TRIGL SERPL-MCNC: 68 MG/DL (ref 35–150)
TSH W FREE THYROID IF ABNORMAL: 1.93 UIU/ML (ref 0.36–3.74)
URINE CULTURE IF INDICATED: ABNORMAL
UROBILINOGEN UR QL STRIP.AUTO: 0.2 EU/DL (ref 0.2–1)
VLDLC SERPL CALC-MCNC: 13.6 MG/DL (ref 6–23)
WBC # BLD AUTO: 4.9 K/UL (ref 4.3–11.1)
WBC MORPH BLD: ABNORMAL
WBC URNS QL MICRO: ABNORMAL /HPF

## 2023-04-07 PROCEDURE — 99212 OFFICE O/P EST SF 10 MIN: CPT | Performed by: STUDENT IN AN ORGANIZED HEALTH CARE EDUCATION/TRAINING PROGRAM

## 2023-04-07 ASSESSMENT — PATIENT HEALTH QUESTIONNAIRE - PHQ9
SUM OF ALL RESPONSES TO PHQ9 QUESTIONS 1 & 2: 0
2. FEELING DOWN, DEPRESSED OR HOPELESS: 0
SUM OF ALL RESPONSES TO PHQ QUESTIONS 1-9: 0
1. LITTLE INTEREST OR PLEASURE IN DOING THINGS: 0
SUM OF ALL RESPONSES TO PHQ QUESTIONS 1-9: 0

## 2023-04-07 NOTE — PROGRESS NOTES
FOLLOW UP VISIT    Subjective:    Lizbeth Joe (: 1960) is a 61 y.o., female,   Chief Complaint   Patient presents with    Breast Pain     Needs dx mammogram and U/S orders       HPI:    Intermittent breast tenderness, left breast may be thicker in nipple area    Healthcare maintenance: The following portions of the patient's history were reviewed and updated as appropriate:      Patient Active Problem List   Diagnosis    Sciatica of left side    Hair loss    Milia    Hyperlipidemia     Past Surgical History:   Procedure Laterality Date     SECTION       History reviewed. No pertinent family history. Social History     Socioeconomic History    Marital status:      Spouse name: Not on file    Number of children: Not on file    Years of education: Not on file    Highest education level: Not on file   Occupational History    Not on file   Tobacco Use    Smoking status: Former     Packs/day: 0.50     Years: 10.00     Pack years: 5.00     Types: Cigarettes     Quit date:      Years since quittin.2    Smokeless tobacco: Never   Vaping Use    Vaping Use: Never used   Substance and Sexual Activity    Alcohol use: Yes     Comment: wine    Drug use: Never    Sexual activity: Not Currently     Partners: Male   Other Topics Concern    Not on file   Social History Narrative    Not on file     Social Determinants of Health     Financial Resource Strain: Low Risk     Difficulty of Paying Living Expenses: Not very hard   Food Insecurity: No Food Insecurity    Worried About Running Out of Food in the Last Year: Never true    [address] in the Last Year: Never true   Transportation Needs: Unknown    Lack of Transportation (Medical): Not on file    Lack of Transportation (Non-Medical):  No   Physical Activity: Not on file   Stress: Not on file   Social Connections: Not on file   Intimate Partner Violence: Not on file   Housing Stability: Unknown    Unable to Pay for Housing in the Last Year:

## 2023-04-07 NOTE — TELEPHONE ENCOUNTER
Marie called from [facility] she need an order corrected. The order need to be US Breast Complete for both breast. The exact order is [id number]. Marie also included a note in th US order. Her number again is [phone].

## 2023-04-10 DIAGNOSIS — N64.4 PAINFUL BREASTS: Primary | ICD-10-CM

## 2023-04-11 LAB
ALBUMIN SERPL-MCNC: 3.6 G/DL (ref 3.2–4.6)
ALBUMIN/GLOB SERPL: 1.3 (ref 0.4–1.6)
ALP SERPL-CCNC: 47 U/L (ref 50–136)
ALT SERPL-CCNC: 22 U/L (ref 12–65)
ANION GAP SERPL CALC-SCNC: 4 MMOL/L (ref 2–11)
AST SERPL-CCNC: 16 U/L (ref 15–37)
BILIRUB SERPL-MCNC: 0.5 MG/DL (ref 0.2–1.1)
BUN SERPL-MCNC: 16 MG/DL (ref 8–23)
CALCIUM SERPL-MCNC: 9 MG/DL (ref 8.3–10.4)
CHLORIDE SERPL-SCNC: 109 MMOL/L (ref 101–110)
CO2 SERPL-SCNC: 26 MMOL/L (ref 21–32)
CREAT SERPL-MCNC: 0.6 MG/DL (ref 0.6–1)
GLOBULIN SER CALC-MCNC: 2.7 G/DL (ref 2.8–4.5)
GLUCOSE SERPL-MCNC: 89 MG/DL (ref 65–100)
POTASSIUM SERPL-SCNC: 4.1 MMOL/L (ref 3.5–5.1)
PROT SERPL-MCNC: 6.3 G/DL (ref 6.3–8.2)
SODIUM SERPL-SCNC: 139 MMOL/L (ref 133–143)

## 2023-05-22 ENCOUNTER — TELEPHONE (OUTPATIENT)
Dept: OBGYN | Age: 63
End: 2023-05-22

## 2023-05-25 ENCOUNTER — TELEPHONE (OUTPATIENT)
Dept: OBGYN | Age: 63
End: 2023-05-25

## 2023-05-25 DIAGNOSIS — D69.6 THROMBOCYTOPENIA (HCC): Primary | ICD-10-CM

## 2023-06-08 ENCOUNTER — OFFICE VISIT (OUTPATIENT)
Dept: OBGYN CLINIC | Age: 63
End: 2023-06-08
Payer: COMMERCIAL

## 2023-06-08 ENCOUNTER — TELEPHONE (OUTPATIENT)
Dept: OBGYN CLINIC | Age: 63
End: 2023-06-08

## 2023-06-08 VITALS
WEIGHT: 177 LBS | HEIGHT: 64 IN | BODY MASS INDEX: 30.22 KG/M2 | DIASTOLIC BLOOD PRESSURE: 82 MMHG | SYSTOLIC BLOOD PRESSURE: 116 MMHG

## 2023-06-08 DIAGNOSIS — Z12.31 ENCOUNTER FOR SCREENING MAMMOGRAM FOR BREAST CANCER: ICD-10-CM

## 2023-06-08 DIAGNOSIS — D21.9 FIBROIDS: ICD-10-CM

## 2023-06-08 DIAGNOSIS — R93.89 THICKENED ENDOMETRIUM: ICD-10-CM

## 2023-06-08 DIAGNOSIS — N95.0 POSTMENOPAUSAL BLEEDING: Primary | ICD-10-CM

## 2023-06-08 PROCEDURE — 76830 TRANSVAGINAL US NON-OB: CPT | Performed by: OBSTETRICS & GYNECOLOGY

## 2023-06-08 PROCEDURE — 99214 OFFICE O/P EST MOD 30 MIN: CPT | Performed by: OBSTETRICS & GYNECOLOGY

## 2023-06-08 RX ORDER — AMOXICILLIN 500 MG/1
500 CAPSULE ORAL 3 TIMES DAILY
COMMUNITY

## 2023-06-08 ASSESSMENT — PATIENT HEALTH QUESTIONNAIRE - PHQ9
2. FEELING DOWN, DEPRESSED OR HOPELESS: 0
SUM OF ALL RESPONSES TO PHQ QUESTIONS 1-9: 0
1. LITTLE INTEREST OR PLEASURE IN DOING THINGS: 0
SUM OF ALL RESPONSES TO PHQ QUESTIONS 1-9: 0
SUM OF ALL RESPONSES TO PHQ9 QUESTIONS 1 & 2: 0

## 2023-06-08 NOTE — TELEPHONE ENCOUNTER
Name: Guerda Fontaine     : 1960    Physicians Information    Recommended Surgery: hysteroscopy with BOBBY Vargas  CPT Code:  Place:[facility]  When: asap  Diagnosis:   Diagnosis Orders   1. Postmenopausal bleeding        2.  Thickened endometrium          Time Needed: 30 min   Loren Blanton MD  Assistant Needed:  Special Request:    Surgery Department Information    Date Scheduled: _____________________ Hospital Pre-Op:______________________        Time Scheduled : ____________________ Surgery Entered: _____________________    Facility: ____________________________ Patient Notified: ______________________    BSO Pre-Op: _______________________ Orders Completed: ___________________

## 2023-06-14 ENCOUNTER — PREP FOR PROCEDURE (OUTPATIENT)
Dept: OBGYN CLINIC | Age: 63
End: 2023-06-14

## 2023-06-14 PROBLEM — N95.0 POSTMENOPAUSAL BLEEDING: Status: ACTIVE | Noted: 2023-06-14

## 2023-06-14 PROBLEM — R93.89 THICKENED ENDOMETRIUM: Status: ACTIVE | Noted: 2023-06-14

## 2023-08-08 ENCOUNTER — ANESTHESIA EVENT (OUTPATIENT)
Dept: SURGERY | Age: 63
End: 2023-08-08
Payer: COMMERCIAL

## 2023-08-08 ASSESSMENT — LIFESTYLE VARIABLES: SMOKING_STATUS: 1

## 2023-08-08 NOTE — PERIOP NOTE
Patient verified name and . Order for consent not found in EHR     Type 1b surgery, PAT phone assessment complete. Orders not received. Labs per surgeon: None  Labs per anesthesia protocol: None    Patient answered medical/surgical history questions at their best of ability. All prior to admission medications documented in EPIC. Patient instructed to take the following medications the day of surgery according to anesthesia guidelines with a small sip of water: None  Hold all vitamins 7 days prior to surgery and NSAIDS 5 days prior to surgery. Prescription meds to hold:None  Patient instructed on the following:    > Arrive at A Entrance, time of arrival to be called the day before by 1700  > NPO after midnight, unless otherwise indicated, including gum, mints, and ice chips  > Responsible adult must drive patient to the hospital, stay during surgery, and patient will need supervision 24 hours after anesthesia  > Use antibacterial soap in shower the night before surgery and on the morning of surgery  > All piercings must be removed prior to arrival.    > Leave all valuables (money and jewelry) at home but bring insurance card and ID on DOS.   > Do not wear make-up, nail polish, lotions, cologne, perfumes, powders, or oil on skin. Artificial nails are not permitted.

## 2023-08-09 ENCOUNTER — ANESTHESIA (OUTPATIENT)
Dept: SURGERY | Age: 63
End: 2023-08-09
Payer: COMMERCIAL

## 2023-08-09 ENCOUNTER — HOSPITAL ENCOUNTER (OUTPATIENT)
Age: 63
Setting detail: OUTPATIENT SURGERY
Discharge: HOME | End: 2023-08-09
Attending: OBSTETRICS & GYNECOLOGY | Admitting: OBSTETRICS & GYNECOLOGY
Payer: COMMERCIAL

## 2023-08-09 VITALS
HEIGHT: 64 IN | RESPIRATION RATE: 14 BRPM | DIASTOLIC BLOOD PRESSURE: 77 MMHG | OXYGEN SATURATION: 98 % | TEMPERATURE: 97.6 F | HEART RATE: 64 BPM | SYSTOLIC BLOOD PRESSURE: 171 MMHG | BODY MASS INDEX: 30.34 KG/M2 | WEIGHT: 177.69 LBS

## 2023-08-09 DIAGNOSIS — R93.89 THICKENED ENDOMETRIUM: ICD-10-CM

## 2023-08-09 DIAGNOSIS — N95.0 POSTMENOPAUSAL BLEEDING: ICD-10-CM

## 2023-08-09 PROCEDURE — 7100000010 HC PHASE II RECOVERY - FIRST 15 MIN: Performed by: OBSTETRICS & GYNECOLOGY

## 2023-08-09 PROCEDURE — 7100000000 HC PACU RECOVERY - FIRST 15 MIN: Performed by: OBSTETRICS & GYNECOLOGY

## 2023-08-09 PROCEDURE — 58558 HYSTEROSCOPY BIOPSY: CPT | Performed by: OBSTETRICS & GYNECOLOGY

## 2023-08-09 PROCEDURE — 7100000011 HC PHASE II RECOVERY - ADDTL 15 MIN: Performed by: OBSTETRICS & GYNECOLOGY

## 2023-08-09 PROCEDURE — 3700000000 HC ANESTHESIA ATTENDED CARE: Performed by: OBSTETRICS & GYNECOLOGY

## 2023-08-09 PROCEDURE — 88305 TISSUE EXAM BY PATHOLOGIST: CPT

## 2023-08-09 PROCEDURE — 2580000003 HC RX 258: Performed by: ANESTHESIOLOGY

## 2023-08-09 PROCEDURE — 6360000002 HC RX W HCPCS: Performed by: OBSTETRICS & GYNECOLOGY

## 2023-08-09 PROCEDURE — 3600000003 HC SURGERY LEVEL 3 BASE: Performed by: OBSTETRICS & GYNECOLOGY

## 2023-08-09 PROCEDURE — 3600000013 HC SURGERY LEVEL 3 ADDTL 15MIN: Performed by: OBSTETRICS & GYNECOLOGY

## 2023-08-09 PROCEDURE — 7100000001 HC PACU RECOVERY - ADDTL 15 MIN: Performed by: OBSTETRICS & GYNECOLOGY

## 2023-08-09 PROCEDURE — 2500000003 HC RX 250 WO HCPCS

## 2023-08-09 PROCEDURE — 6360000002 HC RX W HCPCS

## 2023-08-09 PROCEDURE — 3700000001 HC ADD 15 MINUTES (ANESTHESIA): Performed by: OBSTETRICS & GYNECOLOGY

## 2023-08-09 PROCEDURE — 2709999900 HC NON-CHARGEABLE SUPPLY: Performed by: OBSTETRICS & GYNECOLOGY

## 2023-08-09 RX ORDER — DEXAMETHASONE SODIUM PHOSPHATE 10 MG/ML
INJECTION INTRAMUSCULAR; INTRAVENOUS PRN
Status: DISCONTINUED | OUTPATIENT
Start: 2023-08-09 | End: 2023-08-09 | Stop reason: SDUPTHER

## 2023-08-09 RX ORDER — SODIUM CHLORIDE 0.9 % (FLUSH) 0.9 %
5-40 SYRINGE (ML) INJECTION PRN
Status: DISCONTINUED | OUTPATIENT
Start: 2023-08-09 | End: 2023-08-09 | Stop reason: HOSPADM

## 2023-08-09 RX ORDER — SODIUM CHLORIDE 9 MG/ML
INJECTION, SOLUTION INTRAVENOUS PRN
Status: DISCONTINUED | OUTPATIENT
Start: 2023-08-09 | End: 2023-08-09 | Stop reason: HOSPADM

## 2023-08-09 RX ORDER — LIDOCAINE HYDROCHLORIDE 20 MG/ML
INJECTION, SOLUTION EPIDURAL; INFILTRATION; INTRACAUDAL; PERINEURAL PRN
Status: DISCONTINUED | OUTPATIENT
Start: 2023-08-09 | End: 2023-08-09 | Stop reason: SDUPTHER

## 2023-08-09 RX ORDER — BUPIVACAINE HYDROCHLORIDE 2.5 MG/ML
INJECTION, SOLUTION EPIDURAL; INFILTRATION; INTRACAUDAL PRN
Status: DISCONTINUED | OUTPATIENT
Start: 2023-08-09 | End: 2023-08-09 | Stop reason: ALTCHOICE

## 2023-08-09 RX ORDER — OXYCODONE HYDROCHLORIDE 5 MG/1
5 TABLET ORAL PRN
Status: DISCONTINUED | OUTPATIENT
Start: 2023-08-09 | End: 2023-08-09 | Stop reason: HOSPADM

## 2023-08-09 RX ORDER — SODIUM CHLORIDE 0.9 % (FLUSH) 0.9 %
5-40 SYRINGE (ML) INJECTION EVERY 12 HOURS SCHEDULED
Status: DISCONTINUED | OUTPATIENT
Start: 2023-08-09 | End: 2023-08-09 | Stop reason: HOSPADM

## 2023-08-09 RX ORDER — PROPOFOL 10 MG/ML
INJECTION, EMULSION INTRAVENOUS PRN
Status: DISCONTINUED | OUTPATIENT
Start: 2023-08-09 | End: 2023-08-09 | Stop reason: SDUPTHER

## 2023-08-09 RX ORDER — SODIUM CHLORIDE, SODIUM LACTATE, POTASSIUM CHLORIDE, CALCIUM CHLORIDE 600; 310; 30; 20 MG/100ML; MG/100ML; MG/100ML; MG/100ML
INJECTION, SOLUTION INTRAVENOUS CONTINUOUS
Status: DISCONTINUED | OUTPATIENT
Start: 2023-08-09 | End: 2023-08-09 | Stop reason: HOSPADM

## 2023-08-09 RX ORDER — SODIUM CHLORIDE 9 MG/ML
INJECTION, SOLUTION INTRAVENOUS CONTINUOUS
Status: DISCONTINUED | OUTPATIENT
Start: 2023-08-09 | End: 2023-08-09 | Stop reason: HOSPADM

## 2023-08-09 RX ORDER — ONDANSETRON 2 MG/ML
INJECTION INTRAMUSCULAR; INTRAVENOUS PRN
Status: DISCONTINUED | OUTPATIENT
Start: 2023-08-09 | End: 2023-08-09 | Stop reason: SDUPTHER

## 2023-08-09 RX ORDER — FENTANYL CITRATE 50 UG/ML
INJECTION, SOLUTION INTRAMUSCULAR; INTRAVENOUS PRN
Status: DISCONTINUED | OUTPATIENT
Start: 2023-08-09 | End: 2023-08-09 | Stop reason: SDUPTHER

## 2023-08-09 RX ORDER — FENTANYL CITRATE 50 UG/ML
25 INJECTION, SOLUTION INTRAMUSCULAR; INTRAVENOUS
Status: DISCONTINUED | OUTPATIENT
Start: 2023-08-09 | End: 2023-08-09 | Stop reason: HOSPADM

## 2023-08-09 RX ORDER — SODIUM CHLORIDE 0.9 % (FLUSH) 0.9 %
5-40 SYRINGE (ML) INJECTION PRN
Status: DISCONTINUED | OUTPATIENT
Start: 2023-08-09 | End: 2023-08-09 | Stop reason: SDUPTHER

## 2023-08-09 RX ORDER — FENTANYL CITRATE 50 UG/ML
100 INJECTION, SOLUTION INTRAMUSCULAR; INTRAVENOUS
Status: DISCONTINUED | OUTPATIENT
Start: 2023-08-09 | End: 2023-08-09 | Stop reason: HOSPADM

## 2023-08-09 RX ORDER — SODIUM CHLORIDE 0.9 % (FLUSH) 0.9 %
5-40 SYRINGE (ML) INJECTION EVERY 12 HOURS SCHEDULED
Status: DISCONTINUED | OUTPATIENT
Start: 2023-08-09 | End: 2023-08-09 | Stop reason: SDUPTHER

## 2023-08-09 RX ORDER — OXYCODONE HYDROCHLORIDE 5 MG/1
10 TABLET ORAL PRN
Status: DISCONTINUED | OUTPATIENT
Start: 2023-08-09 | End: 2023-08-09 | Stop reason: HOSPADM

## 2023-08-09 RX ORDER — SODIUM CHLORIDE 9 MG/ML
INJECTION, SOLUTION INTRAVENOUS PRN
Status: DISCONTINUED | OUTPATIENT
Start: 2023-08-09 | End: 2023-08-09 | Stop reason: SDUPTHER

## 2023-08-09 RX ORDER — EPHEDRINE SULFATE/0.9% NACL/PF 50 MG/5 ML
SYRINGE (ML) INTRAVENOUS PRN
Status: DISCONTINUED | OUTPATIENT
Start: 2023-08-09 | End: 2023-08-09 | Stop reason: SDUPTHER

## 2023-08-09 RX ORDER — KETOROLAC TROMETHAMINE 30 MG/ML
INJECTION, SOLUTION INTRAMUSCULAR; INTRAVENOUS PRN
Status: DISCONTINUED | OUTPATIENT
Start: 2023-08-09 | End: 2023-08-09 | Stop reason: SDUPTHER

## 2023-08-09 RX ORDER — MIDAZOLAM HYDROCHLORIDE 2 MG/2ML
2 INJECTION, SOLUTION INTRAMUSCULAR; INTRAVENOUS
Status: DISCONTINUED | OUTPATIENT
Start: 2023-08-09 | End: 2023-08-09 | Stop reason: HOSPADM

## 2023-08-09 RX ORDER — LIDOCAINE HYDROCHLORIDE 10 MG/ML
1 INJECTION, SOLUTION INFILTRATION; PERINEURAL
Status: DISCONTINUED | OUTPATIENT
Start: 2023-08-09 | End: 2023-08-09 | Stop reason: HOSPADM

## 2023-08-09 RX ORDER — DIPHENHYDRAMINE HYDROCHLORIDE 50 MG/ML
6.25 INJECTION INTRAMUSCULAR; INTRAVENOUS
Status: DISCONTINUED | OUTPATIENT
Start: 2023-08-09 | End: 2023-08-09 | Stop reason: HOSPADM

## 2023-08-09 RX ORDER — ONDANSETRON 2 MG/ML
4 INJECTION INTRAMUSCULAR; INTRAVENOUS
Status: DISCONTINUED | OUTPATIENT
Start: 2023-08-09 | End: 2023-08-09 | Stop reason: HOSPADM

## 2023-08-09 RX ADMIN — LIDOCAINE HYDROCHLORIDE 80 MG: 20 INJECTION, SOLUTION EPIDURAL; INFILTRATION; INTRACAUDAL; PERINEURAL at 07:45

## 2023-08-09 RX ADMIN — Medication 10 MG: at 07:49

## 2023-08-09 RX ADMIN — FENTANYL CITRATE 50 MCG: 50 INJECTION, SOLUTION INTRAMUSCULAR; INTRAVENOUS at 07:41

## 2023-08-09 RX ADMIN — ONDANSETRON 4 MG: 2 INJECTION INTRAMUSCULAR; INTRAVENOUS at 07:48

## 2023-08-09 RX ADMIN — PROPOFOL 170 MG: 10 INJECTION, EMULSION INTRAVENOUS at 07:45

## 2023-08-09 RX ADMIN — DEXAMETHASONE SODIUM PHOSPHATE 10 MG: 10 INJECTION INTRAMUSCULAR; INTRAVENOUS at 07:48

## 2023-08-09 RX ADMIN — KETOROLAC TROMETHAMINE 30 MG: 30 INJECTION, SOLUTION INTRAMUSCULAR; INTRAVENOUS at 08:09

## 2023-08-09 RX ADMIN — SODIUM CHLORIDE, SODIUM LACTATE, POTASSIUM CHLORIDE, AND CALCIUM CHLORIDE: 600; 310; 30; 20 INJECTION, SOLUTION INTRAVENOUS at 06:41

## 2023-08-09 NOTE — OP NOTE
Fluffy tissue noted in the right cornual area and in the cervix. .    A gentle sharp curettage was then performed and good uterine cri noted 360 degrees around the uterine cavity. The hysteroscope was re-inserted and  the uterine cavity appeared normal. All instruments removed from the uterus. A paracervical block was performed for postoperative pain control using 1/4% bupivicaine without epinephrine, being sure to aspirate prior to injection. 10cc was placed at the 4 and 8 o'clock positions adjacent to the cervix. The tenaculum was removed and sites were hemostatic. All instruments removed from the vagina. All sponge, lap, and needle counts were correct x 3. Patient was taken to the recovery room in stable condition. I was present and scrubbed for the entire procedure.      Pathology: endometrial curetting          Electronically signed by Carlota Crockett MD on 8/9/2023 at 8:26 AM

## 2023-08-09 NOTE — H&P
Nancy Franklin, [address], [address] [facility]     Ian Motley MD, Terrance Marley MD, TARAN Lay-BC  Assessment/Plan      Hola Linares was seen today for surgery     Diagnoses and all orders for this visit:     Postmenopausal bleeding  Comments:  - endometrium is thickened at 6-7 mm (should be < 4 mm)  - discussed endometrial sampling, since she has had several episodes, recommend D&C  - she has discontinued her HRT as recommended  - discussed R/B/A of hysteroscopy, D&C and she would like to proceed. If polyp found, will remove. Discussed need to evaluate for hyperplasia or malignancy. Surgery today. Fibroids  Comments:  - several small fibroids measured (0.91, 1.11, 0.95, 0.87 cm)  - do not appear submucosal  - likely not contributory       Thickened endometrium  Comments:  - as above, expect < 4 mm in postmenopause  - recommend endometrial sampling, options discussed  - pt would like h/s, D&C. The R/B/A of hysteroscopy D&C  were discussed. Risks include but are not limited to pain, bleeding, infection, reaction to anesthesia/medications, VTE, death. Risks also include perforation of uterus, damage to surrounding structures including bowel, rectum, bladder, blood vessels, nerves, ureters, female reproductive organs, and need for further surgery. Risk also includes recurrence of  abnormal bleeding in the future. Dorcas Del Rio 61 y.o. [id number] presents for a gyn problem of postmenopausal bleeding. She has been on HRT x 3 years. She has discontinued her compounded HRT cream. When she was seen on 4/16/23, she reported 2 episodes of PMB. Since then, she has had 2 more episodes. She had bleeding 5/22 when she was in [place] that was light. The following month, she had 8-10 days of heavy bleeding, some cramping pain. No clots. The prescriber of her HRT told her to discontinue.  No major issues since she has been off the

## 2023-08-09 NOTE — ANESTHESIA POSTPROCEDURE EVALUATION
Department of Anesthesiology  Postprocedure Note    Patient: Riccardo Burkitt  MRN: [id number]  YOB: 1960  Date of evaluation: 8/9/2023      Procedure Summary     Date: 08/09/23 [facility]    Anesthesia Start: 0732 Anesthesia Stop: 5757    Procedure: DILATATION AND CURETTAGE HYSTEROSCOPY (Vagina ) Diagnosis:       Postmenopausal bleeding      Thickened endometrium      (Postmenopausal bleeding [N95.0])      (Thickened endometrium [R93.89])    Surgeons: Ana Trujillo MD Responsible Provider: Jenni Coronel MD    Anesthesia Type: general ASA Status: 2          Anesthesia Type: No value filed.     Santiago Phase I: Santiago Score: 10    Santiago Phase II: Santiago Score: 10      Anesthesia Post Evaluation    Patient location during evaluation: PACU  Patient participation: complete - patient participated  Level of consciousness: awake  Airway patency: patent  Nausea & Vomiting: no nausea  Complications: no  Cardiovascular status: blood pressure returned to baseline and hemodynamically stable  Respiratory status: acceptable  Hydration status: stable  Multimodal analgesia pain management approach  Pain management: adequate

## 2023-08-09 NOTE — PERIOP NOTE
MD fuentes  at bedside with patient. Pt VSS stable. Pain and Nausea controlled at this time. Verbal sign out per MD when pacu care is completed. Plan of care continues.

## 2023-08-10 ENCOUNTER — TELEPHONE (OUTPATIENT)
Dept: OBGYN CLINIC | Age: 63
End: 2023-08-10

## 2023-08-10 NOTE — TELEPHONE ENCOUNTER
Called patient, updated her on patho results. Patient verbalized understanding and denied questions/thoughts/concerns at this time. Confirmed post-op visit on 8/23/2023 with patient.

## 2023-08-10 NOTE — TELEPHONE ENCOUNTER
Please let Antonia Corea know that the pathology from her surgery showed benign tissue. I hope she is recovering well. Thank you!

## 2023-08-16 ENCOUNTER — HOSPITAL ENCOUNTER (OUTPATIENT)
Dept: MAMMOGRAPHY | Age: 63
Discharge: HOME | End: 2023-08-19
Attending: OBSTETRICS & GYNECOLOGY
Payer: COMMERCIAL

## 2023-08-16 VITALS — HEIGHT: 64 IN | BODY MASS INDEX: 29.37 KG/M2 | WEIGHT: 172 LBS

## 2023-08-16 DIAGNOSIS — Z12.31 ENCOUNTER FOR SCREENING MAMMOGRAM FOR BREAST CANCER: ICD-10-CM

## 2023-08-16 PROCEDURE — 77063 BREAST TOMOSYNTHESIS BI: CPT

## 2023-08-23 ENCOUNTER — OFFICE VISIT (OUTPATIENT)
Dept: OBGYN CLINIC | Age: 63
End: 2023-08-23
Payer: COMMERCIAL

## 2023-08-23 VITALS
BODY MASS INDEX: 29.19 KG/M2 | SYSTOLIC BLOOD PRESSURE: 116 MMHG | HEIGHT: 64 IN | WEIGHT: 171 LBS | DIASTOLIC BLOOD PRESSURE: 62 MMHG

## 2023-08-23 DIAGNOSIS — N95.0 POSTMENOPAUSAL BLEEDING: Primary | ICD-10-CM

## 2023-08-23 DIAGNOSIS — N89.8 VAGINAL DRYNESS: ICD-10-CM

## 2023-08-23 DIAGNOSIS — N89.8 VAGINAL ODOR: ICD-10-CM

## 2023-08-23 PROCEDURE — 99213 OFFICE O/P EST LOW 20 MIN: CPT | Performed by: OBSTETRICS & GYNECOLOGY

## 2023-08-23 ASSESSMENT — PATIENT HEALTH QUESTIONNAIRE - PHQ9
SUM OF ALL RESPONSES TO PHQ QUESTIONS 1-9: 1
1. LITTLE INTEREST OR PLEASURE IN DOING THINGS: 1
SUM OF ALL RESPONSES TO PHQ QUESTIONS 1-9: 1
SUM OF ALL RESPONSES TO PHQ QUESTIONS 1-9: 1
SUM OF ALL RESPONSES TO PHQ9 QUESTIONS 1 & 2: 1
2. FEELING DOWN, DEPRESSED OR HOPELESS: 0
SUM OF ALL RESPONSES TO PHQ QUESTIONS 1-9: 1

## 2023-08-23 NOTE — PROGRESS NOTES
Patient here for post-op visit. D/C hysteroscopy performed on 8/9/2023. Patient denies bleeding, aches/fever/chills, and/or pain today. Patient states she has noticed an odor the last 1-2 weeks. Denies itching, discharge, and/or burning. States she has been using lavender antibiotic lavender shower soap, denies douching. LAST PAP:  4/13/2023, neg., HPV neg. LAST MAMMO:  8/16/2023 (still being processed)    LMP:  No LMP recorded.  Patient is postmenopausal.    Sexually active:  not currently     BIRTH CONTROL:  post menopausal status    TOBACCO USE:  No    PHQ: 1     FAMILY HISTORY OF:   Breast Cancer:  Yes   Ovarian Cancer:  No   Uterine Cancer:  No   Colon Cancer:  No    Vitals:    08/23/23 1042   BP: 116/62   Site: Left Upper Arm   Position: Sitting   Weight: 171 lb (77.6 kg)   Height: 5' 4\" (1.626 m)        Marly Griffin RN  08/23/23  10:47 AM
4 Term         PAM   3 Term         PAM   2 Term         PAM   1 SAB               Obstetric Comments   3 c-sections            Past Medical History:   Diagnosis Date    Hyperlipidemia             Past Surgical History:   Procedure Laterality Date    BREAST CYST ASPIRATION       SECTION      DILATION AND CURETTAGE OF UTERUS N/A 2023    DILATATION AND CURETTAGE HYSTEROSCOPY performed by Carole Krishna MD at [address]              Family History   Problem Relation Age of Onset    Breast Cancer Mother         She had breast cancer at 80years old    Colon Cancer Neg Hx     Uterine Cancer Neg Hx     Ovarian Cancer Neg Hx            Social History     Socioeconomic History    Marital status:      Spouse name: Not on file    Number of children: Not on file    Years of education: Not on file    Highest education level: Not on file   Occupational History    Not on file   Tobacco Use    Smoking status: Former     Packs/day: 0.50     Years: 10.00     Pack years: 5.00     Types: Cigarettes     Quit date: 2000     Years since quittin.6    Smokeless tobacco: Never   Vaping Use    Vaping Use: Never used   Substance and Sexual Activity    Alcohol use: Yes     Comment: 2 glass a month    Drug use: Never    Sexual activity: Not Currently     Partners: Male   Other Topics Concern    Not on file   Social History Narrative    Abuse: Feels safe at home, no history of physical abuse, no history of sexual abuse      Social Determinants of Health     Financial Resource Strain: Low Risk     Difficulty of Paying Living Expenses: Not very hard   Food Insecurity: No Food Insecurity    Worried About Running Out of Food in the Last Year: Never true    Ran Out of Food in the Last Year: Never true   Transportation Needs: Unknown    Lack of Transportation (Medical): Not on file    Lack of Transportation (Non-Medical):  No   Physical Activity: Not on file   Stress: Not on file

## 2023-08-25 LAB
A VAGINAE DNA VAG QL NAA+PROBE: NORMAL SCORE
BVAB2 DNA VAG QL NAA+PROBE: NORMAL SCORE
C ALBICANS DNA VAG QL NAA+PROBE: NEGATIVE
C GLABRATA DNA VAG QL NAA+PROBE: NEGATIVE
MEGA1 DNA VAG QL NAA+PROBE: NORMAL SCORE
SPECIMEN SOURCE: NORMAL
T VAGINALIS RRNA SPEC QL NAA+PROBE: NEGATIVE

## 2023-10-09 ENCOUNTER — TELEPHONE (OUTPATIENT)
Dept: OBGYN CLINIC | Age: 63
End: 2023-10-09

## 2023-10-09 NOTE — TELEPHONE ENCOUNTER
Has Kd Burdick gotten additional imaging done for her mammogram? It looks like they saw calcifications in the left breast and recommended magnification views.  THanks

## 2023-10-10 NOTE — TELEPHONE ENCOUNTER
Called patient, patient verbalizes that she knows the recommendation for additional imaging. Explained why it is recommended, and did discuss with patient that she can check with insurance and radiologist with what imaging will be done and if it will be covered prior to it being completed. Patient then asked if she should do the additional imaging with radiology at [facility] or if she can utilize her work place benefits of breast scans. Stated to patient that it is recommended to f/u with [facility] unless patient can find out what type of scan her work place offers and if it can get magnified imaging on the left breast.     Patient states she will call her workplace, insurance, and radiologist and call the office back with updated info.

## 2023-10-18 ENCOUNTER — TELEPHONE (OUTPATIENT)
Dept: OBGYN CLINIC | Age: 63
End: 2023-10-18

## 2023-10-18 ENCOUNTER — HOSPITAL ENCOUNTER (OUTPATIENT)
Dept: MAMMOGRAPHY | Age: 63
Discharge: HOME | End: 2023-10-21
Attending: OBSTETRICS & GYNECOLOGY
Payer: COMMERCIAL

## 2023-10-18 DIAGNOSIS — R92.8 ABNORMAL MAMMOGRAPHY: ICD-10-CM

## 2023-10-18 PROCEDURE — 77065 DX MAMMO INCL CAD UNI: CPT

## 2023-10-18 NOTE — TELEPHONE ENCOUNTER
Pt lvm asking for the price of her additional imaging today. Called pt back, pt stated that she received the price at the end of her Pre-check in. Pt wanted to know what I got and I stated that I was going to suggest she talk to [facility]. Pt voiced understanding.

## 2023-10-24 ENCOUNTER — HOSPITAL ENCOUNTER (OUTPATIENT)
Dept: MAMMOGRAPHY | Age: 63
Discharge: HOME | End: 2023-10-27
Payer: COMMERCIAL

## 2023-10-24 DIAGNOSIS — R92.1 CALCIFICATION OF LEFT BREAST: ICD-10-CM

## 2023-10-24 PROCEDURE — 2500000003 HC RX 250 WO HCPCS: Performed by: OBSTETRICS & GYNECOLOGY

## 2023-10-24 PROCEDURE — 77065 DX MAMMO INCL CAD UNI: CPT

## 2023-10-24 PROCEDURE — 76098 X-RAY EXAM SURGICAL SPECIMEN: CPT

## 2023-10-24 PROCEDURE — 88305 TISSUE EXAM BY PATHOLOGIST: CPT

## 2023-10-24 PROCEDURE — 19081 BX BREAST 1ST LESION STRTCTC: CPT

## 2023-10-24 RX ORDER — MAGNESIUM HYDROXIDE 1200 MG/15ML
250 LIQUID ORAL ONCE
Status: DISCONTINUED | OUTPATIENT
Start: 2023-10-24 | End: 2023-10-28 | Stop reason: HOSPADM

## 2023-10-24 RX ORDER — LIDOCAINE HYDROCHLORIDE 10 MG/ML
5 INJECTION, SOLUTION INFILTRATION; PERINEURAL ONCE
Status: COMPLETED | OUTPATIENT
Start: 2023-10-24 | End: 2023-10-24

## 2023-10-24 RX ORDER — LIDOCAINE HYDROCHLORIDE AND EPINEPHRINE 10; 10 MG/ML; UG/ML
20 INJECTION, SOLUTION INFILTRATION; PERINEURAL ONCE
Status: COMPLETED | OUTPATIENT
Start: 2023-10-24 | End: 2023-10-24

## 2023-10-24 RX ADMIN — LIDOCAINE HYDROCHLORIDE 5 ML: 10 INJECTION, SOLUTION INFILTRATION; PERINEURAL at 11:35

## 2023-10-24 RX ADMIN — LIDOCAINE HYDROCHLORIDE,EPINEPHRINE BITARTRATE 15 ML: 10; .01 INJECTION, SOLUTION INFILTRATION; PERINEURAL at 11:39

## 2023-10-24 ASSESSMENT — PAIN - FUNCTIONAL ASSESSMENT: PAIN_FUNCTIONAL_ASSESSMENT: 0-10

## 2023-10-26 ENCOUNTER — CLINICAL DOCUMENTATION (OUTPATIENT)
Dept: MAMMOGRAPHY | Age: 63
End: 2023-10-26

## 2023-10-26 NOTE — PROGRESS NOTES
Patient arrived to the breast center for her biopsy results. Dr. Hillary Zepeda and I were present when he informed the patient that her biopsy had come back as DCIS. The patient had no post biopsy issues or concerns and is scheduled for a MRI 10-30 @ 11:00. I provided the patient with an information packet that included her pathology report and points of contact at the oncology center. I informed the patient that one of our breast oncology navigators would be reaching out in the next couple of days to discuss her recent diagnosis and provide her with consultation appointments for both surgery and oncology.

## 2023-10-27 ENCOUNTER — TELEPHONE (OUTPATIENT)
Dept: CASE MANAGEMENT | Age: 63
End: 2023-10-27

## 2023-10-27 NOTE — TELEPHONE ENCOUNTER
10/27/2023  Breast Navigation  intake complete for New Patient Breast Cancer. Reviewed role of navigation, gave contact information for navigators, discussed pathology report and  pending receptor status, reviewed upcoming appointments. Patient is working the  at the IV therapy \"[employer] . Independent in self care no physical limitations. Barriers:  no financial, psychosocial,or transportation barriers noted. Lives with her  Ann Gaston who is her primary support person. Verbal permission given to speak with Ann Gaston. Family history of breast cancer: Mother at age 80 and patient had breast biopsy in 2010 which showed ADH  Type of cancer: Left breast DCIS, ER/AR pending  MRI   10/30  Social determinants of health and Depression screen complete. Referring Provider:  Dr Verna Bustillo  Added MD and Navigator to treatment team   Appointment with Oncology: Dr Pedro Juarez 12/13  Appointment with Surgery: Dr Marissa Landry 8:30 am   My Chart message to patient with navigation contact information and upcoming appointments. Attached link for  [employer] [facility] Support in the Community provided by the [employer] with opportunities for programs both in person and virtually. Routed note to referring provider regarding intake and upcoming appointments.

## 2023-10-30 ENCOUNTER — HOSPITAL ENCOUNTER (OUTPATIENT)
Dept: MRI IMAGING | Age: 63
Discharge: HOME | End: 2023-11-02
Payer: COMMERCIAL

## 2023-10-30 DIAGNOSIS — D05.12 DUCTAL CARCINOMA IN SITU (DCIS) OF LEFT BREAST: ICD-10-CM

## 2023-10-30 PROCEDURE — A9579 GAD-BASE MR CONTRAST NOS,1ML: HCPCS | Performed by: OBSTETRICS & GYNECOLOGY

## 2023-10-30 PROCEDURE — C8908 MRI W/O FOL W/CONT, BREAST,: HCPCS

## 2023-10-30 PROCEDURE — 6360000004 HC RX CONTRAST MEDICATION: Performed by: OBSTETRICS & GYNECOLOGY

## 2023-10-30 RX ADMIN — GADOTERIDOL 17 ML: 279.3 INJECTION, SOLUTION INTRAVENOUS at 11:33

## 2023-10-31 ENCOUNTER — PREP FOR PROCEDURE (OUTPATIENT)
Dept: SURGERY | Age: 63
End: 2023-10-31

## 2023-10-31 ENCOUNTER — OFFICE VISIT (OUTPATIENT)
Dept: SURGERY | Age: 63
End: 2023-10-31
Payer: COMMERCIAL

## 2023-10-31 VITALS
SYSTOLIC BLOOD PRESSURE: 135 MMHG | WEIGHT: 165 LBS | HEIGHT: 64 IN | BODY MASS INDEX: 28.17 KG/M2 | DIASTOLIC BLOOD PRESSURE: 77 MMHG | HEART RATE: 71 BPM

## 2023-10-31 DIAGNOSIS — D05.12 DUCTAL CARCINOMA IN SITU (DCIS) OF LEFT BREAST: ICD-10-CM

## 2023-10-31 DIAGNOSIS — Z80.3 FAMILY HISTORY OF BREAST CANCER: ICD-10-CM

## 2023-10-31 DIAGNOSIS — D05.12 DUCTAL CARCINOMA IN SITU (DCIS) OF LEFT BREAST: Primary | ICD-10-CM

## 2023-10-31 DIAGNOSIS — D05.12 INTRADUCTAL CARCINOMA IN SITU OF LEFT BREAST: ICD-10-CM

## 2023-10-31 DIAGNOSIS — D05.12 INTRADUCTAL CARCINOMA IN SITU OF LEFT BREAST: Primary | ICD-10-CM

## 2023-10-31 PROCEDURE — 99204 OFFICE O/P NEW MOD 45 MIN: CPT | Performed by: SURGERY

## 2023-10-31 NOTE — PROGRESS NOTES
2023    Mabel Nguyen  MRN: [id number]      CHIEF COMPLAINT: Left breast DCIS      PRIMARY CARE PHYSICIAN: Orion Hicks MD      HISTORY:  Screening mammogram showed new calcifications in the left breast at 2:00 5 cm from the nipple. Biopsy was done showing ductal carcinoma, intermediate grade, ER/AR positive. MRI shows the biopsy clip in the 2:30 position of the left breast 4 cm from the nipple with only thin surrounding enhancement measuring up to 2.2 cm. She denies palpable breast masses, nipple discharge, or breast pain. She has family history of breast cancer in her mother in her 80s. REVIEW OF SYSTEMS:  Review of Systems   Constitutional: Negative. HENT: Negative. Eyes: Negative. Respiratory: Negative. Cardiovascular: Negative. Gastrointestinal: Negative. Endocrine: Negative. Genitourinary: Negative. Musculoskeletal: Negative. Skin: Negative. Allergic/Immunologic: Negative. Neurological: Negative. Hematological: Negative. Psychiatric/Behavioral: Negative. Past Medical History:   Diagnosis Date    Hyperlipidemia        No current outpatient medications on file. No current facility-administered medications for this visit. Family History   Problem Relation Age of Onset    Breast Cancer Mother         She had breast cancer at 80years old    Colon Cancer Neg Hx     Uterine Cancer Neg Hx     Ovarian Cancer Neg Hx        Social History     Socioeconomic History    Marital status:      Spouse name: None    Number of children: None    Years of education: None    Highest education level: None   Tobacco Use    Smoking status: Former     Packs/day: 0.50     Years: 10.00     Additional pack years: 0.00     Total pack years: 5.00     Types: Cigarettes     Quit date: 2000     Years since quittin.8    Smokeless tobacco: Never   Vaping Use    Vaping Use: Never used   Substance and Sexual Activity    Alcohol use:  Yes

## 2023-11-01 ASSESSMENT — ENCOUNTER SYMPTOMS
GASTROINTESTINAL NEGATIVE: 1
RESPIRATORY NEGATIVE: 1
ALLERGIC/IMMUNOLOGIC NEGATIVE: 1
EYES NEGATIVE: 1

## 2023-11-02 ENCOUNTER — CLINICAL DOCUMENTATION (OUTPATIENT)
Dept: CASE MANAGEMENT | Age: 63
End: 2023-11-02

## 2023-11-07 ENCOUNTER — TELEPHONE (OUTPATIENT)
Dept: OBGYN CLINIC | Age: 63
End: 2023-11-07

## 2023-11-07 ENCOUNTER — TELEPHONE (OUTPATIENT)
Dept: SURGERY | Age: 63
End: 2023-11-07

## 2023-11-07 NOTE — TELEPHONE ENCOUNTER
Pt called to request Dr. Bossman Yee as her radiation oncologist. Zahraa Mann a message to Dr. Jose Luis Lee.

## 2023-11-07 NOTE — TELEPHONE ENCOUNTER
Called patient to see how she was doing with new DCIS diagnosis. She stated that she is doing well, at times overwhelmed and has a lot of questions still. Reassured her that she is in excellent hands and that her doctors will provide her with information regarding recommendations and risk/benefits of various treatment options as they learn more about her case. She has an upcoming surgery with Dr. Oliva Soriano and also has a visit with Dr. Frieda Neal with oncology. She is interested in doing DCISionRT testing. Offered her my support and encouraged her to call if there is anything I can assist her with.

## 2023-11-09 ENCOUNTER — TELEPHONE (OUTPATIENT)
Dept: MAMMOGRAPHY | Age: 63
End: 2023-11-09

## 2023-11-09 ENCOUNTER — TELEPHONE (OUTPATIENT)
Dept: OBGYN CLINIC | Age: 63
End: 2023-11-09

## 2023-11-09 DIAGNOSIS — D05.12 INTRADUCTAL CARCINOMA IN SITU OF LEFT BREAST: Primary | ICD-10-CM

## 2023-11-09 NOTE — TELEPHONE ENCOUNTER
Consult Note  General Surgery    CC:   Chief Complaint   Patient presents with    Abdominal Pain     lower right sided abd pain x 1 day w diarrhea     HPI: 60 yo female with hx of lap gastric sleeve, MERVAT who presents to the ER with 1 day history of RLQ pain. Associated symptoms include diarrhea that started two days ago. Toradol in the ER helped with the pain. Her pain is sharp and non radiating. She denies any nausea/vomiting, decreased PO intake, fever/chills, dysuria, melena, BRBPR, hematuria, CP or SOB. Only had watermelon this morning.     Previous abdominal surgeries include laparoscopic sleeve gastrectomy by Dr. Colon in 2017 and laparoscopic cholecystectomy by Dr. Goldberg in 2016.     PMH:   Past Medical History:   Diagnosis Date    Allergy     Elevated blood pressure     Insomnia 9/10/2012    MERVAT (obstructive sleep apnea)        PSH:   Past Surgical History:   Procedure Laterality Date    BREAST CYST EXCISION Left      SECTION      CHOLECYSTECTOMY      CHOLECYSTECTOMY-LAPAROSCOPIC N/A 2016    Performed by Joshua Goldberg, MD at [facility] OR 2ND FLR    COLONOSCOPY N/A 4/3/2018    Performed by Eugene Stover MD at [facility] ENDO (4TH FLR)    DILATION AND CURETTAGE OF UTERUS      GASTRECTOMY      GASTRECTOMY-SLEEVE-LAPAROSCOPIC - 80628 w/ Intraop egd N/A 2017    Performed by Naresh Colon MD at [facility] OR 2ND FLR       Allergies: Review of patient's allergies indicates:  No Known Allergies  Meds:      Medication List      ASK your doctor about these medications    B COMPLEX ORAL     CALCIUM CITRATE + D ORAL     cyanocobalamin (vitamin B-12) 1,000 mcg/mL Drop     ergocalciferol 50,000 unit Cap  Commonly known as:  ERGOCALCIFEROL  TAKE 1 CAPSULE BY MOUTH 2 TIMES A WEEK     ibuprofen 600 MG tablet  Commonly known as:  ADVIL,MOTRIN  Take 1 tablet (600 mg total) by mouth every 8 (eight) hours as needed for Pain.     ONE DAILY MULTIVITAMIN per tablet  Generic drug:   Patient rescheduled Mag[employer] multivitamin            Family History   Problem Relation Age of Onset    Heart disease Father     Hypertension Father     Diabetes Brother     Hypertension Sister     Heart disease Brother     Hypertension Brother        Review of Systems - For pertinent positives please see HPI   Constitutional: Negative for fever and chills.   HENT: Negative for congestion and ear pain.   Respiratory: Negative for cough and shortness of breath.   Cardiovascular: Negative for chest pain and palpitations.   Gastrointestinal: See HPI   Genitourinary: Negative for dysuria and hematuria.   Musculoskeletal: Negative for myalgias and arthralgias.   Skin: Negative for rash and wound.   Neurological: Negative for weakness and numbness.   Psychiatric/Behavioral: Negative for confusion and dysphoric mood    OBJECTIVE:     Vital Signs (Most Recent)  Temp: 98.3 °F (36.8 °C) (01/12/19 1514)  Pulse: 60 (01/12/19 1514)  Resp: 17 (01/12/19 1248)  BP: (!) 148/70 (01/12/19 1514)  SpO2: 100 % (01/12/19 1514)    Vital Signs Range (Last 24H):  Temp:  [98.3 °F (36.8 °C)-98.6 °F (37 °C)]   Pulse:  [60-77]   Resp:  [17-19]   BP: (147-189)/(70-91)   SpO2:  [98 %-100 %]     I & O (Last 24H):No intake or output data in the 24 hours ending 01/12/19 1642    Physical Exam:  General: well developed, well nourished, no distress   Head: NC / AT  EOMI / no scleral icterus   Neck: supple, no tracheal deviation  Lungs:  clear to auscultation bilaterally and normal respiratory effort  Heart: regular rate and rhythm, S1, S2 normal, no murmur, rub or gallop  Abdomen: soft, ND. Laparoscopic incisions are well healed. No hernia. Tender to palpation in RLQ. No rebound or guarding.   Neuro: A&O x3, no focal deficits   Ext: wwp, no edema       Laboratory:  CBC:   Recent Labs   Lab 01/12/19  1313   WBC 6.49   RBC 4.00   HGB 12.4   HCT 38.1      MCV 95   MCH 31.0   MCHC 32.5     CMP:   Recent Labs   Lab 01/12/19  1313   GLU 98   CALCIUM 9.9   ALBUMIN 3.9   PROT  8.5*      K 4.0   CO2 27      BUN 14   CREATININE 0.8   ALKPHOS 103   ALT 19   AST 20   BILITOT 1.2*     Coagulation: No results for input(s): PT, INR, APTT in the last 168 hours.  Labs within the past 24 hours have been reviewed.    CT abdomen/pelvis with contrast:  FINDINGS:  Images of the lower thorax are remarkable for minimal dependent atelectasis.    The liver is minimally hypoattenuating may reflect phase of contrast however correlation with LFTs is recommended to exclude changes of steatosis.  The spleen, pancreas and adrenal glands are unremarkable.  The gallbladder is surgically absent.  There is no biliary dilation or abdominal ascites.  Postsurgical changes of gastrectomy noted.  The portal vein, splenic vein, SMV, celiac axis and SMA all are patent.  No significant abdominal lymphadenopathy.    The kidneys enhance symmetrically without hydronephrosis or nephrolithiasis.  There is a subcentimeter hypoattenuating lesion within the interpolar region of the left kidney, too small for characterization.  The bilateral ureters are grossly unremarkable along their visualized extent, no definite calculi seen.  The urinary bladder is grossly unremarkable.  The uterus has a lobular contour, suggesting underlying leiomyoma.  The adnexa is otherwise grossly unremarkable.    The large bowel is remarkable for moderate stool distally.  There is inflammation and thickening of the appendix, noting pericecal lymphadenopathy, findings are most consistent with acute appendicitis.  No findings to suggest focal organized fluid collection or free air at this time.  The small bowel is grossly unremarkable.  No focal organized pelvic fluid collection.    Degenerative changes are noted of the spine without focal osseous destructive process.  No significant inguinal lymphadenopathy.  Postsurgical changes are noted of the anterior abdominal wall.      Impression       1. Findings most consistent with acute appendicitis,  no focal organized fluid collection or free air at this time.  2. Uterine leiomyoma.  3. Status post partial gastrectomy without complication.  4. Please see above for several additional findings.     UA: negative.     ASSESSMENT/PLAN:     62 yo female with hx of lap gastric sleeve and lap dmitry presents with early acute appendicitis     Clinically stable without white count, afebrile.   Admit to general surgery, Dr. Catrina Bryan for clears. NPO @ midnight  IV ceftriaxone and flagyl   LR @ 125   Randi for home meds   Consent for lap appy, placed in chart   Prn norco for pain   Prn zofran/phenergan if nausea     Rafael Liang, PGY-4  General Surgery  [phone]

## 2023-12-20 ENCOUNTER — APPOINTMENT (RX ONLY)
Dept: URBAN - METROPOLITAN AREA CLINIC 329 | Facility: CLINIC | Age: 63
Setting detail: DERMATOLOGY
End: 2023-12-20

## 2023-12-20 DIAGNOSIS — D22 MELANOCYTIC NEVI: ICD-10-CM

## 2023-12-20 DIAGNOSIS — L72.8 OTHER FOLLICULAR CYSTS OF THE SKIN AND SUBCUTANEOUS TISSUE: ICD-10-CM

## 2023-12-20 DIAGNOSIS — L72.0 EPIDERMAL CYST: ICD-10-CM

## 2023-12-20 DIAGNOSIS — L82.1 OTHER SEBORRHEIC KERATOSIS: ICD-10-CM

## 2023-12-20 PROBLEM — D22.61 MELANOCYTIC NEVI OF RIGHT UPPER LIMB, INCLUDING SHOULDER: Status: ACTIVE | Noted: 2023-12-20

## 2023-12-20 PROCEDURE — ? FULL BODY SKIN EXAM - DECLINED

## 2023-12-20 PROCEDURE — ? SUNSCREEN RECOMMENDATIONS

## 2023-12-20 PROCEDURE — ? DEFER

## 2023-12-20 PROCEDURE — 99203 OFFICE O/P NEW LOW 30 MIN: CPT

## 2023-12-20 PROCEDURE — ? ADDITIONAL NOTES

## 2023-12-20 PROCEDURE — ? COUNSELING

## 2023-12-20 ASSESSMENT — LOCATION SIMPLE DESCRIPTION DERM
LOCATION SIMPLE: GLABELLA
LOCATION SIMPLE: RIGHT HAND
LOCATION SIMPLE: RIGHT BREAST
LOCATION SIMPLE: SCALP

## 2023-12-20 ASSESSMENT — LOCATION DETAILED DESCRIPTION DERM
LOCATION DETAILED: GLABELLA
LOCATION DETAILED: RIGHT MEDIAL BREAST 1-2:00 REGION
LOCATION DETAILED: RIGHT ULNAR DORSAL HAND
LOCATION DETAILED: LEFT SUPERIOR PARIETAL SCALP

## 2023-12-20 ASSESSMENT — LOCATION ZONE DERM
LOCATION ZONE: SCALP
LOCATION ZONE: TRUNK
LOCATION ZONE: HAND
LOCATION ZONE: FACE

## 2023-12-20 NOTE — PROCEDURE: DEFER
Detail Level: Detailed
Introduction Text (Please End With A Colon): The following procedure was deferred: 20 minute cyst excision with Dr. Pfeiffer.
Instructions (Optional): Informed pt that cyst is not worrisome so she declines to have it removed right now
Size Of Lesion In Cm (Optional): 0

## 2023-12-20 NOTE — PROCEDURE: ADDITIONAL NOTES
Cooperative/Awake/Alert
Detail Level: Simple
Additional Notes: Izzy isn’t present today so informed pt to come back and have it removed if it reoccurs
Render Risk Assessment In Note?: no

## 2024-02-06 ENCOUNTER — HOSPITAL ENCOUNTER (OUTPATIENT)
Dept: RADIATION ONCOLOGY | Age: 64
Setting detail: RECURRING SERIES
Discharge: HOME | End: 2024-02-09
Payer: COMMERCIAL

## 2024-02-06 VITALS
OXYGEN SATURATION: 97 % | TEMPERATURE: 98.1 F | BODY MASS INDEX: 30.31 KG/M2 | DIASTOLIC BLOOD PRESSURE: 81 MMHG | WEIGHT: 176.6 LBS | SYSTOLIC BLOOD PRESSURE: 122 MMHG | HEART RATE: 102 BPM

## 2024-02-06 PROCEDURE — 99211 OFF/OP EST MAY X REQ PHY/QHP: CPT

## 2024-02-06 ASSESSMENT — PATIENT HEALTH QUESTIONNAIRE - PHQ9
SUM OF ALL RESPONSES TO PHQ QUESTIONS 1-9: 0
2. FEELING DOWN, DEPRESSED OR HOPELESS: 0
SUM OF ALL RESPONSES TO PHQ QUESTIONS 1-9: 0
SUM OF ALL RESPONSES TO PHQ QUESTIONS 1-9: 0
1. LITTLE INTEREST OR PLEASURE IN DOING THINGS: 0
SUM OF ALL RESPONSES TO PHQ QUESTIONS 1-9: 0
SUM OF ALL RESPONSES TO PHQ9 QUESTIONS 1 & 2: 0

## 2024-02-06 ASSESSMENT — PAIN DESCRIPTION - LOCATION: LOCATION: BACK

## 2024-02-06 NOTE — PROGRESS NOTES
Consult DCIS Left Breast.  Pt arrived alone for consult.  She twisted her back a few days ago and is having discomfort.   Lumpectomy 12-5-23.  No previous RT history.   Pt works at the [employer].  She states they sent off her bloodwork and she is interested in possible high dose vitamin C treatment.  She states she will f/u with Dr. Ritter.  She is not really wanting RT or Endocrine therapy.  She was happy that Dr. Churchill offered DCISion RT testing but she is not sure she would want that unless she had high risk disease.  No Radiation teaching was done today.  Pt will call us if she decides to move forward with DCISion RT /RT.    Loyda Frey, RN    
cancer at 80 years old    Colon Cancer Neg Hx     Uterine Cancer Neg Hx     Ovarian Cancer Neg Hx        REVIEW OF SYSTEMS:   A full 12-point review of systems was completed and was negative unless noted in the history of present illness.    PHYSICAL EXAMINATION:   ECOG Performance status 0  VITAL SIGNS: There were no vitals filed for this visit.     General: well developed/nourished adult Female in no acute distress; appears stated age  HEENT: normocephalic, atraumatic; EOMI  Neck: supple with full ROM  Respiratory: normal inspiratory effort, no audible wheezes  Extremities: no cyanosis, clubbing, or edema  Musculoskeletal: mobility intact x4; normal ROM in all joints  Neuro: AOx3; sensation intact x 4; CNII-XII grossly intact  Psych: appropriate affect, insight, and judgement  Breast: deferred    PATHOLOGY:    I personally reviewed the pathology reports as documented in the HPI.    LABORATORY:   Lab Results   Component Value Date/Time     04/07/2023 07:12 AM    K 4.1 04/07/2023 07:12 AM     04/07/2023 07:12 AM    CO2 26 04/07/2023 07:12 AM    BUN 16 04/07/2023 07:12 AM    GLOB 2.7 04/07/2023 07:12 AM    ALT 22 04/07/2023 07:12 AM     Lab Results   Component Value Date/Time    WBC 4.9 04/07/2023 07:12 AM    HGB 12.5 04/07/2023 07:12 AM    HCT 38.6 04/07/2023 07:12 AM    PLT 59 04/07/2023 07:12 AM       RADIOLOGY:    I personally reviewed the images and agree with the findings as documented in the HPI.    IMPRESSION:  Alida Pastrana is a 64 y.o. female with intermediate grade DCIS, ER/AZ+, s/p lumpectomy with no residual disease.    I reviewed in detail the findings from Alida Pastrana's workup and treatment to date. I discussed adjuvant treatment options including observation, radiation, and endocrine therapy. I discussed that radiation may decrease the risk of local recurrence but is not expected to impact overall survival. I reviewed results of the ECOG 5194 and RTOG 9804 studies demonstrating

## 2024-02-26 PROBLEM — Z86.010 HX OF COLONIC POLYPS: Status: ACTIVE | Noted: 2024-02-26

## 2024-02-26 PROBLEM — Z86.0100 HX OF COLONIC POLYPS: Status: ACTIVE | Noted: 2024-02-26

## 2024-07-30 ENCOUNTER — OFFICE VISIT (OUTPATIENT)
Dept: OBGYN CLINIC | Age: 64
End: 2024-07-30
Payer: COMMERCIAL

## 2024-07-30 VITALS
HEIGHT: 64 IN | BODY MASS INDEX: 28.85 KG/M2 | SYSTOLIC BLOOD PRESSURE: 108 MMHG | WEIGHT: 169 LBS | DIASTOLIC BLOOD PRESSURE: 70 MMHG

## 2024-07-30 DIAGNOSIS — M85.80 OSTEOPENIA, UNSPECIFIED LOCATION: ICD-10-CM

## 2024-07-30 DIAGNOSIS — Z13.0 SCREENING FOR ENDOCRINE, METABOLIC AND IMMUNITY DISORDER: ICD-10-CM

## 2024-07-30 DIAGNOSIS — Z13.29 SCREENING FOR ENDOCRINE, METABOLIC AND IMMUNITY DISORDER: ICD-10-CM

## 2024-07-30 DIAGNOSIS — N89.8 VAGINAL ODOR: ICD-10-CM

## 2024-07-30 DIAGNOSIS — Z13.228 SCREENING FOR ENDOCRINE, METABOLIC AND IMMUNITY DISORDER: ICD-10-CM

## 2024-07-30 DIAGNOSIS — D05.12 INTRADUCTAL CARCINOMA IN SITU OF LEFT BREAST: ICD-10-CM

## 2024-07-30 DIAGNOSIS — Z01.411 ENCOUNTER FOR GYNECOLOGICAL EXAMINATION (GENERAL) (ROUTINE) WITH ABNORMAL FINDINGS: Primary | ICD-10-CM

## 2024-07-30 PROCEDURE — 99213 OFFICE O/P EST LOW 20 MIN: CPT | Performed by: OBSTETRICS & GYNECOLOGY

## 2024-07-30 PROCEDURE — 99396 PREV VISIT EST AGE 40-64: CPT | Performed by: OBSTETRICS & GYNECOLOGY

## 2024-07-30 PROCEDURE — 99459 PELVIC EXAMINATION: CPT | Performed by: OBSTETRICS & GYNECOLOGY

## 2024-07-30 NOTE — PROGRESS NOTES
Chaperone for Intimate Exam     Chaperone was offer accepted as part of the rooming process    Chaperone: Karen Howe

## 2024-07-30 NOTE — PROGRESS NOTES
[facility]  [address], [place] [address]  [phone]    Ana Robles MD, FACOG  Juan Carlos Tapia MD, FACOG  TARAN Swanson-BC        Assessment/Plan     Diagnoses and all orders for this visit:    Encounter for gynecological examination (general) (routine) with abnormal findings  Comments:  - pap 23 neg, HPV neg. NOrmal cx on exam  - self breast awareness encouraged  - MRI due, seeing surgeon in [place]. Encouraged f/u with onc.   - PCP [facility]  - DEXA ordered (h/o osteopenia)  - fasting labs ordered per request. H/o thrombocytopenia thought to be due to plt clumping so ordered in sodium citrate tube    Intraductal carcinoma in situ of left breast  Comments:  - s/p lumpectomy  - declined XRT and endocrine therapy  - has f/u with surgeon, needs MRI. Encouraged f/u with onc locally.    Vaginal odor  Comments:  - nuswab obtained  Orders:  -     Nuswab Vaginitis (VG); Future  -     Nuswab Vaginitis (VG)    Osteopenia, unspecified location  Comments:  - repeat DEXA ordered  Orders:  -     DEXA BONE DENSITY AXIAL SKELETON; Future    Screening for endocrine, metabolic and immunity disorder  -     CBC with Auto Differential; Future  -     TSH with Reflex; Future  -     Lipid Panel; Future  -     Hemoglobin A1C; Future  -     Comprehensive Metabolic Panel; Future  -     Vitamin D 25 Hydroxy; Future    BMI 29.0-29.9,adult  Comments:  - exercise and healthy diet encouraged           Subjective     Alida Pastrana  64 y.o.  presents today for annual Gyn exam.  PMH significant for DCIS in L breast and she is s/p lumpectomy 23 with Dr Dotson in [place]. She has chosen not to proceed with any XRT or endocrine therapy. Was supposed to have follow up MRI which has not been done yet. Was scheduled to see onc here but has not been seen yet. She would prefer to get care in [place] after her f/u with Dr Dotson. No breast changes other than she has some irritation at the scar. No lumps, skin

## 2024-07-30 NOTE — PROGRESS NOTES
Pt comes in today for AE. Pt states she still periodically has a vaginal odor, states she feels like the odor comes when she is dehydrated. Pt states she also feels like she has to stay hydrated for her urine to feel normal. Pt would like blood work done today, CBC, vitamin levels, and hormones. Pt also states she has a sore in her nose that has not gone away, states it usually comes during spring or winter. Also states she went to a dermatologist for a spot on her back but it has not gone away. Also has some numbness in her right ankle, this has been occurring for 2 years.     LAST PAP:  04/13/2023 negative, HPV negative     LAST MAMMO:  12/05/2023      LMP:  No LMP recorded. Patient is postmenopausal.    BIRTH CONTROL:  post menopausal status    TOBACCO USE:  No    FAMILY HISTORY OF:   Breast Cancer:  Yes   Ovarian Cancer:  No   Uterine Cancer:  No   Colon Cancer:  No    Vitals:    07/30/24 1054   BP: 108/70   Site: Left Upper Arm   Position: Sitting   Weight: 76.7 kg (169 lb)   Height: 1.626 m (5' 4\")        Kierra Palacios MA  07/30/24  11:14 AM

## 2024-08-08 DIAGNOSIS — Z13.29 SCREENING FOR ENDOCRINE, METABOLIC AND IMMUNITY DISORDER: ICD-10-CM

## 2024-08-08 DIAGNOSIS — Z13.228 SCREENING FOR ENDOCRINE, METABOLIC AND IMMUNITY DISORDER: ICD-10-CM

## 2024-08-08 DIAGNOSIS — Z13.0 SCREENING FOR ENDOCRINE, METABOLIC AND IMMUNITY DISORDER: ICD-10-CM

## 2024-08-08 LAB
25(OH)D3 SERPL-MCNC: 32.4 NG/ML (ref 30–100)
ALBUMIN SERPL-MCNC: 3.7 G/DL (ref 3.2–4.6)
ALBUMIN/GLOB SERPL: 1.3 (ref 1–1.9)
ALP SERPL-CCNC: 63 U/L (ref 35–104)
ALT SERPL-CCNC: 19 U/L (ref 12–65)
ANION GAP SERPL CALC-SCNC: 10 MMOL/L (ref 9–18)
AST SERPL-CCNC: 20 U/L (ref 15–37)
BASOPHILS # BLD: 0.1 K/UL (ref 0–0.2)
BASOPHILS NFR BLD: 1 % (ref 0–2)
BILIRUB SERPL-MCNC: 0.2 MG/DL (ref 0–1.2)
BUN SERPL-MCNC: 14 MG/DL (ref 8–23)
CALCIUM SERPL-MCNC: 9.7 MG/DL (ref 8.8–10.2)
CHLORIDE SERPL-SCNC: 103 MMOL/L (ref 98–107)
CHOLEST SERPL-MCNC: 302 MG/DL (ref 0–200)
CO2 SERPL-SCNC: 26 MMOL/L (ref 20–28)
CREAT SERPL-MCNC: 0.71 MG/DL (ref 0.6–1.1)
DIFFERENTIAL METHOD BLD: ABNORMAL
EOSINOPHIL # BLD: 0.4 K/UL (ref 0–0.8)
EOSINOPHIL NFR BLD: 8 % (ref 0.5–7.8)
ERYTHROCYTE [DISTWIDTH] IN BLOOD BY AUTOMATED COUNT: 13.1 % (ref 11.9–14.6)
EST. AVERAGE GLUCOSE BLD GHB EST-MCNC: 116 MG/DL
GLOBULIN SER CALC-MCNC: 2.9 G/DL (ref 2.3–3.5)
GLUCOSE SERPL-MCNC: 95 MG/DL (ref 70–99)
HBA1C MFR BLD: 5.7 % (ref 0–5.6)
HCT VFR BLD AUTO: 38.5 % (ref 35.8–46.3)
HDLC SERPL-MCNC: 96 MG/DL (ref 40–60)
HDLC SERPL: 3.1 (ref 0–5)
HGB BLD-MCNC: 12.7 G/DL (ref 11.7–15.4)
IMM GRANULOCYTES # BLD AUTO: 0 K/UL (ref 0–0.5)
IMM GRANULOCYTES NFR BLD AUTO: 0 % (ref 0–5)
LDLC SERPL CALC-MCNC: 194 MG/DL (ref 0–100)
LYMPHOCYTES # BLD: 2.1 K/UL (ref 0.5–4.6)
LYMPHOCYTES NFR BLD: 45 % (ref 13–44)
MCH RBC QN AUTO: 30.2 PG (ref 26.1–32.9)
MCHC RBC AUTO-ENTMCNC: 32.9 G/DL (ref 31.4–35)
MCV RBC AUTO: 91.9 FL (ref 82–102)
MONOCYTES # BLD: 0.5 K/UL (ref 0.1–1.3)
MONOCYTES NFR BLD: 10 % (ref 4–12)
NEUTS SEG # BLD: 1.7 K/UL (ref 1.7–8.2)
NEUTS SEG NFR BLD: 37 % (ref 43–78)
NRBC # BLD: 0 K/UL (ref 0–0.2)
PLATELET # BLD AUTO: 245 K/UL (ref 150–450)
PMV BLD AUTO: 9.6 FL (ref 9.4–12.3)
POTASSIUM SERPL-SCNC: 4.5 MMOL/L (ref 3.5–5.1)
PROT SERPL-MCNC: 6.6 G/DL (ref 6.3–8.2)
RBC # BLD AUTO: 4.19 M/UL (ref 4.05–5.2)
SODIUM SERPL-SCNC: 139 MMOL/L (ref 136–145)
TRIGL SERPL-MCNC: 57 MG/DL (ref 0–150)
TSH W FREE THYROID IF ABNORMAL: 2.95 UIU/ML (ref 0.27–4.2)
VLDLC SERPL CALC-MCNC: 11 MG/DL (ref 6–23)
WBC # BLD AUTO: 5.2 K/UL (ref 4.3–11.1)

## 2024-12-09 ENCOUNTER — TELEPHONE (OUTPATIENT)
Dept: OBGYN CLINIC | Age: 64
End: 2024-12-09

## 2024-12-09 NOTE — TELEPHONE ENCOUNTER
Pt lvm stating she would like information regarding a referral Dr. Robles sent and also would like information about Dr. Robles leaving and not practicing under [facility]. Called pt back, no answer, LVM for pt to call office back.

## 2024-12-09 NOTE — TELEPHONE ENCOUNTER
Called patient back, patient asked if Dr. Robles is going anywhere else to practice.   Explained to patient that she is not and is taking this time for family.     Stated to patient that I can get her scheduled with our other providers, Heidi Marquez NP or Dr. Tapia. I continued to tell patient that if she wants to go elsewhere that I can give her the phone number of [facility] and [facility].     Patient states she will most likely stay with our practice and will let us know which provider she will get scheduled with. Patient declines cancelling current AE for 8/5/25.     Patient verbalized understanding of everything spoken about in phone call.     Sent [employer] message with above information and the phone numbers of [facility] and [facility] ob[facility].

## (undated) DEVICE — GLOVE ORANGE PI 7   MSG9070

## (undated) DEVICE — SOLUTION IRRIG 3000ML 0.9% SOD CHL USP UROMATIC PLAS CONT

## (undated) DEVICE — GLOVE SURG SZ 65 CRM LTX FREE POLYISOPRENE POLYMER BEAD ANTI

## (undated) DEVICE — NEEDLE SPNL 22GA L3.5IN BLK HUB S STL REG WALL FIT STYL W/

## (undated) DEVICE — CONTROL SYRINGE LUER-LOCK TIP: Brand: MONOJECT

## (undated) DEVICE — MINOR LITHOTOMY PACK: Brand: MEDLINE INDUSTRIES, INC.

## (undated) DEVICE — DRAPE,UNDERBUTTOCKS,PCH,STERILE: Brand: MEDLINE

## (undated) DEVICE — CARDINAL HEALTH FLEXIBLE LIGHT HANDLE COVER: Brand: CARDINAL HEALTH

## (undated) DEVICE — SET ENDOSCP SEAL HYSTEROSCOPE RIG OUTFLO CHN DISP MYOSURE